# Patient Record
Sex: FEMALE | Race: BLACK OR AFRICAN AMERICAN | Employment: FULL TIME | URBAN - METROPOLITAN AREA
[De-identification: names, ages, dates, MRNs, and addresses within clinical notes are randomized per-mention and may not be internally consistent; named-entity substitution may affect disease eponyms.]

---

## 2018-08-21 ENCOUNTER — OP HISTORICAL/CONVERTED ENCOUNTER (OUTPATIENT)
Dept: OTHER | Age: 73
End: 2018-08-21

## 2018-08-22 ENCOUNTER — IP HISTORICAL/CONVERTED ENCOUNTER (OUTPATIENT)
Dept: OTHER | Age: 73
End: 2018-08-22

## 2018-09-06 ENCOUNTER — OP HISTORICAL/CONVERTED ENCOUNTER (OUTPATIENT)
Dept: OTHER | Age: 73
End: 2018-09-06

## 2018-09-20 ENCOUNTER — OP HISTORICAL/CONVERTED ENCOUNTER (OUTPATIENT)
Dept: OTHER | Age: 73
End: 2018-09-20

## 2018-10-04 ENCOUNTER — OP HISTORICAL/CONVERTED ENCOUNTER (OUTPATIENT)
Dept: OTHER | Age: 73
End: 2018-10-04

## 2018-10-25 ENCOUNTER — OP HISTORICAL/CONVERTED ENCOUNTER (OUTPATIENT)
Dept: OTHER | Age: 73
End: 2018-10-25

## 2020-06-27 ENCOUNTER — OP HISTORICAL/CONVERTED ENCOUNTER (OUTPATIENT)
Dept: OTHER | Age: 75
End: 2020-06-27

## 2021-08-18 ENCOUNTER — APPOINTMENT (OUTPATIENT)
Dept: GENERAL RADIOLOGY | Age: 76
DRG: 640 | End: 2021-08-18
Attending: EMERGENCY MEDICINE
Payer: MEDICARE

## 2021-08-18 ENCOUNTER — HOSPITAL ENCOUNTER (INPATIENT)
Age: 76
LOS: 3 days | Discharge: HOME | DRG: 640 | End: 2021-08-22
Attending: STUDENT IN AN ORGANIZED HEALTH CARE EDUCATION/TRAINING PROGRAM | Admitting: INTERNAL MEDICINE
Payer: MEDICARE

## 2021-08-18 DIAGNOSIS — I50.9 ACUTE ON CHRONIC HEART FAILURE, UNSPECIFIED HEART FAILURE TYPE (HCC): ICD-10-CM

## 2021-08-18 DIAGNOSIS — I47.29 NONSUSTAINED VENTRICULAR TACHYCARDIA: ICD-10-CM

## 2021-08-18 DIAGNOSIS — E87.6 HYPOKALEMIA: Primary | ICD-10-CM

## 2021-08-18 LAB
ALBUMIN SERPL-MCNC: 3.4 G/DL (ref 3.5–5)
ALBUMIN/GLOB SERPL: 1 {RATIO} (ref 1.1–2.2)
ALP SERPL-CCNC: 47 U/L (ref 45–117)
ALT SERPL-CCNC: 23 U/L (ref 12–78)
ANION GAP SERPL CALC-SCNC: 5 MMOL/L (ref 5–15)
AST SERPL W P-5'-P-CCNC: 18 U/L (ref 15–37)
BASOPHILS # BLD: 0 K/UL (ref 0–0.1)
BASOPHILS NFR BLD: 1 % (ref 0–1)
BILIRUB SERPL-MCNC: 1.2 MG/DL (ref 0.2–1)
BUN SERPL-MCNC: 18 MG/DL (ref 6–20)
BUN/CREAT SERPL: 18 (ref 12–20)
CA-I BLD-MCNC: 8.6 MG/DL (ref 8.5–10.1)
CHLORIDE SERPL-SCNC: 105 MMOL/L (ref 97–108)
CK SERPL-CCNC: 70 NG/ML (ref 26–192)
CO2 SERPL-SCNC: 30 MMOL/L (ref 21–32)
CREAT SERPL-MCNC: 0.98 MG/DL (ref 0.55–1.02)
DIFFERENTIAL METHOD BLD: NORMAL
EOSINOPHIL # BLD: 0.1 K/UL (ref 0–0.4)
EOSINOPHIL NFR BLD: 2 % (ref 0–7)
ERYTHROCYTE [DISTWIDTH] IN BLOOD BY AUTOMATED COUNT: 14.1 % (ref 11.5–14.5)
GLOBULIN SER CALC-MCNC: 3.4 G/DL (ref 2–4)
GLUCOSE SERPL-MCNC: 120 MG/DL (ref 65–100)
HCT VFR BLD AUTO: 37.2 % (ref 35–47)
HGB BLD-MCNC: 12.2 G/DL (ref 11.5–16)
IMM GRANULOCYTES # BLD AUTO: 0 K/UL (ref 0–0.04)
IMM GRANULOCYTES NFR BLD AUTO: 0 % (ref 0–0.5)
LYMPHOCYTES # BLD: 2.4 K/UL (ref 0.8–3.5)
LYMPHOCYTES NFR BLD: 47 % (ref 12–49)
MAGNESIUM SERPL-MCNC: 2.1 MG/DL (ref 1.6–2.4)
MCH RBC QN AUTO: 30.8 PG (ref 26–34)
MCHC RBC AUTO-ENTMCNC: 32.8 G/DL (ref 30–36.5)
MCV RBC AUTO: 93.9 FL (ref 80–99)
MONOCYTES # BLD: 0.6 K/UL (ref 0–1)
MONOCYTES NFR BLD: 12 % (ref 5–13)
NEUTS SEG # BLD: 2 K/UL (ref 1.8–8)
NEUTS SEG NFR BLD: 38 % (ref 32–75)
NRBC # BLD: 0 K/UL (ref 0–0.01)
NRBC BLD-RTO: 0 PER 100 WBC
PLATELET # BLD AUTO: 205 K/UL (ref 150–400)
PMV BLD AUTO: 10 FL (ref 8.9–12.9)
POTASSIUM SERPL-SCNC: 2.6 MMOL/L (ref 3.5–5.1)
PROT SERPL-MCNC: 6.8 G/DL (ref 6.4–8.2)
RBC # BLD AUTO: 3.96 M/UL (ref 3.8–5.2)
SODIUM SERPL-SCNC: 140 MMOL/L (ref 136–145)
TROPONIN I SERPL-MCNC: <0.05 NG/ML
WBC # BLD AUTO: 5.1 K/UL (ref 3.6–11)

## 2021-08-18 PROCEDURE — 85025 COMPLETE CBC W/AUTO DIFF WBC: CPT

## 2021-08-18 PROCEDURE — 83880 ASSAY OF NATRIURETIC PEPTIDE: CPT

## 2021-08-18 PROCEDURE — 96366 THER/PROPH/DIAG IV INF ADDON: CPT

## 2021-08-18 PROCEDURE — 71045 X-RAY EXAM CHEST 1 VIEW: CPT

## 2021-08-18 PROCEDURE — 99285 EMERGENCY DEPT VISIT HI MDM: CPT

## 2021-08-18 PROCEDURE — 96365 THER/PROPH/DIAG IV INF INIT: CPT

## 2021-08-18 PROCEDURE — 83735 ASSAY OF MAGNESIUM: CPT

## 2021-08-18 PROCEDURE — 82550 ASSAY OF CK (CPK): CPT

## 2021-08-18 PROCEDURE — 74011250637 HC RX REV CODE- 250/637: Performed by: STUDENT IN AN ORGANIZED HEALTH CARE EDUCATION/TRAINING PROGRAM

## 2021-08-18 PROCEDURE — 36415 COLL VENOUS BLD VENIPUNCTURE: CPT

## 2021-08-18 PROCEDURE — 80053 COMPREHEN METABOLIC PANEL: CPT

## 2021-08-18 PROCEDURE — 93005 ELECTROCARDIOGRAM TRACING: CPT

## 2021-08-18 PROCEDURE — 74011250636 HC RX REV CODE- 250/636

## 2021-08-18 PROCEDURE — 84484 ASSAY OF TROPONIN QUANT: CPT

## 2021-08-18 RX ORDER — POTASSIUM CHLORIDE 20 MEQ/1
60 TABLET, EXTENDED RELEASE ORAL
Status: COMPLETED | OUTPATIENT
Start: 2021-08-18 | End: 2021-08-18

## 2021-08-18 RX ORDER — POTASSIUM CHLORIDE 7.45 MG/ML
10 INJECTION INTRAVENOUS
Status: COMPLETED | OUTPATIENT
Start: 2021-08-18 | End: 2021-08-19

## 2021-08-18 RX ORDER — POTASSIUM CHLORIDE 7.45 MG/ML
INJECTION INTRAVENOUS
Status: COMPLETED
Start: 2021-08-18 | End: 2021-08-18

## 2021-08-18 RX ADMIN — POTASSIUM CHLORIDE 60 MEQ: 1500 TABLET, EXTENDED RELEASE ORAL at 23:42

## 2021-08-18 RX ADMIN — POTASSIUM CHLORIDE 10 MEQ: 7.46 INJECTION, SOLUTION INTRAVENOUS at 22:47

## 2021-08-19 PROBLEM — E87.6 HYPOKALEMIA: Status: ACTIVE | Noted: 2021-08-19

## 2021-08-19 PROBLEM — I47.20 VT (VENTRICULAR TACHYCARDIA): Status: ACTIVE | Noted: 2021-08-19

## 2021-08-19 PROBLEM — R55 SYNCOPE: Status: ACTIVE | Noted: 2021-08-19

## 2021-08-19 PROBLEM — I50.9 ACUTE CHF (CONGESTIVE HEART FAILURE) (HCC): Status: ACTIVE | Noted: 2021-08-19

## 2021-08-19 LAB
ANION GAP SERPL CALC-SCNC: 5 MMOL/L (ref 5–15)
BNP SERPL-MCNC: 9974 PG/ML
BUN SERPL-MCNC: 15 MG/DL (ref 6–20)
BUN/CREAT SERPL: 17 (ref 12–20)
CA-I BLD-MCNC: 7.9 MG/DL (ref 8.5–10.1)
CHLORIDE SERPL-SCNC: 110 MMOL/L (ref 97–108)
CO2 SERPL-SCNC: 29 MMOL/L (ref 21–32)
CREAT SERPL-MCNC: 0.86 MG/DL (ref 0.55–1.02)
ERYTHROCYTE [DISTWIDTH] IN BLOOD BY AUTOMATED COUNT: 14.2 % (ref 11.5–14.5)
GLUCOSE SERPL-MCNC: 87 MG/DL (ref 65–100)
HCT VFR BLD AUTO: 42.3 % (ref 35–47)
HGB BLD-MCNC: 13.4 G/DL (ref 11.5–16)
MCH RBC QN AUTO: 30.1 PG (ref 26–34)
MCHC RBC AUTO-ENTMCNC: 31.7 G/DL (ref 30–36.5)
MCV RBC AUTO: 95.1 FL (ref 80–99)
MRSA DNA SPEC QL NAA+PROBE: NOT DETECTED
NRBC # BLD: 0 K/UL (ref 0–0.01)
NRBC BLD-RTO: 0 PER 100 WBC
PLATELET # BLD AUTO: 179 K/UL (ref 150–400)
PMV BLD AUTO: 9.6 FL (ref 8.9–12.9)
POTASSIUM SERPL-SCNC: 4 MMOL/L (ref 3.5–5.1)
RBC # BLD AUTO: 4.45 M/UL (ref 3.8–5.2)
SODIUM SERPL-SCNC: 144 MMOL/L (ref 136–145)
TROPONIN I SERPL-MCNC: <0.05 NG/ML
WBC # BLD AUTO: 4.1 K/UL (ref 3.6–11)

## 2021-08-19 PROCEDURE — 74011250637 HC RX REV CODE- 250/637: Performed by: HOSPITALIST

## 2021-08-19 PROCEDURE — 36415 COLL VENOUS BLD VENIPUNCTURE: CPT

## 2021-08-19 PROCEDURE — 74011250636 HC RX REV CODE- 250/636: Performed by: INTERNAL MEDICINE

## 2021-08-19 PROCEDURE — 74011250637 HC RX REV CODE- 250/637: Performed by: INTERNAL MEDICINE

## 2021-08-19 PROCEDURE — 74011250636 HC RX REV CODE- 250/636: Performed by: HOSPITALIST

## 2021-08-19 PROCEDURE — 74011000258 HC RX REV CODE- 258: Performed by: INTERNAL MEDICINE

## 2021-08-19 PROCEDURE — 80048 BASIC METABOLIC PNL TOTAL CA: CPT

## 2021-08-19 PROCEDURE — 65270000029 HC RM PRIVATE

## 2021-08-19 PROCEDURE — 96366 THER/PROPH/DIAG IV INF ADDON: CPT

## 2021-08-19 PROCEDURE — 74011250636 HC RX REV CODE- 250/636: Performed by: STUDENT IN AN ORGANIZED HEALTH CARE EDUCATION/TRAINING PROGRAM

## 2021-08-19 PROCEDURE — 85027 COMPLETE CBC AUTOMATED: CPT

## 2021-08-19 PROCEDURE — 96368 THER/DIAG CONCURRENT INF: CPT

## 2021-08-19 PROCEDURE — 84484 ASSAY OF TROPONIN QUANT: CPT

## 2021-08-19 PROCEDURE — 87641 MR-STAPH DNA AMP PROBE: CPT

## 2021-08-19 RX ORDER — ACETAMINOPHEN 650 MG/1
650 SUPPOSITORY RECTAL
Status: DISCONTINUED | OUTPATIENT
Start: 2021-08-19 | End: 2021-08-22 | Stop reason: HOSPADM

## 2021-08-19 RX ORDER — HYDROXYZINE PAMOATE 25 MG/1
25 CAPSULE ORAL
Status: DISCONTINUED | OUTPATIENT
Start: 2021-08-19 | End: 2021-08-22

## 2021-08-19 RX ORDER — SPIRONOLACTONE 25 MG/1
25 TABLET ORAL DAILY
Status: DISCONTINUED | OUTPATIENT
Start: 2021-08-20 | End: 2021-08-22 | Stop reason: HOSPADM

## 2021-08-19 RX ORDER — CARVEDILOL 12.5 MG/1
12.5 TABLET ORAL 2 TIMES DAILY WITH MEALS
Status: DISCONTINUED | OUTPATIENT
Start: 2021-08-19 | End: 2021-08-22 | Stop reason: HOSPADM

## 2021-08-19 RX ORDER — ENOXAPARIN SODIUM 100 MG/ML
40 INJECTION SUBCUTANEOUS DAILY
Status: DISCONTINUED | OUTPATIENT
Start: 2021-08-19 | End: 2021-08-22 | Stop reason: HOSPADM

## 2021-08-19 RX ORDER — CARVEDILOL 12.5 MG/1
12.5 TABLET ORAL 2 TIMES DAILY WITH MEALS
COMMUNITY

## 2021-08-19 RX ORDER — LEVOTHYROXINE SODIUM 100 UG/1
100 TABLET ORAL
COMMUNITY

## 2021-08-19 RX ORDER — ONDANSETRON 2 MG/ML
4 INJECTION INTRAMUSCULAR; INTRAVENOUS
Status: DISCONTINUED | OUTPATIENT
Start: 2021-08-19 | End: 2021-08-22 | Stop reason: HOSPADM

## 2021-08-19 RX ORDER — SODIUM CHLORIDE 0.9 % (FLUSH) 0.9 %
5-40 SYRINGE (ML) INJECTION AS NEEDED
Status: DISCONTINUED | OUTPATIENT
Start: 2021-08-19 | End: 2021-08-22 | Stop reason: HOSPADM

## 2021-08-19 RX ORDER — ACETAMINOPHEN 650 MG/1
660 SUPPOSITORY RECTAL
Status: DISCONTINUED | OUTPATIENT
Start: 2021-08-19 | End: 2021-08-19

## 2021-08-19 RX ORDER — ASPIRIN 81 MG/1
81 TABLET ORAL DAILY
Status: DISCONTINUED | OUTPATIENT
Start: 2021-08-20 | End: 2021-08-22 | Stop reason: HOSPADM

## 2021-08-19 RX ORDER — POLYETHYLENE GLYCOL 3350 17 G/17G
17 POWDER, FOR SOLUTION ORAL DAILY PRN
Status: DISCONTINUED | OUTPATIENT
Start: 2021-08-19 | End: 2021-08-22 | Stop reason: HOSPADM

## 2021-08-19 RX ORDER — AMIODARONE HYDROCHLORIDE 200 MG/1
200 TABLET ORAL DAILY
Status: ON HOLD | COMMUNITY
End: 2021-08-22 | Stop reason: SDUPTHER

## 2021-08-19 RX ORDER — ASPIRIN 81 MG/1
81 TABLET ORAL DAILY
COMMUNITY

## 2021-08-19 RX ORDER — ACETAMINOPHEN 325 MG/1
650 TABLET ORAL
Status: DISCONTINUED | OUTPATIENT
Start: 2021-08-19 | End: 2021-08-19

## 2021-08-19 RX ORDER — FUROSEMIDE 40 MG/1
40 TABLET ORAL DAILY
COMMUNITY

## 2021-08-19 RX ORDER — MAGNESIUM SULFATE HEPTAHYDRATE 40 MG/ML
2 INJECTION, SOLUTION INTRAVENOUS
Status: COMPLETED | OUTPATIENT
Start: 2021-08-19 | End: 2021-08-19

## 2021-08-19 RX ORDER — FAMOTIDINE 10 MG/1
10 TABLET ORAL 2 TIMES DAILY
Status: DISCONTINUED | OUTPATIENT
Start: 2021-08-19 | End: 2021-08-22 | Stop reason: HOSPADM

## 2021-08-19 RX ORDER — FAMOTIDINE 20 MG/1
20 TABLET, FILM COATED ORAL 2 TIMES DAILY
Status: DISCONTINUED | OUTPATIENT
Start: 2021-08-19 | End: 2021-08-19

## 2021-08-19 RX ORDER — SODIUM CHLORIDE 0.9 % (FLUSH) 0.9 %
5-40 SYRINGE (ML) INJECTION EVERY 8 HOURS
Status: DISCONTINUED | OUTPATIENT
Start: 2021-08-19 | End: 2021-08-22 | Stop reason: HOSPADM

## 2021-08-19 RX ORDER — FUROSEMIDE 10 MG/ML
40 INJECTION INTRAMUSCULAR; INTRAVENOUS DAILY
Status: DISCONTINUED | OUTPATIENT
Start: 2021-08-20 | End: 2021-08-22 | Stop reason: HOSPADM

## 2021-08-19 RX ORDER — HYDRALAZINE HYDROCHLORIDE AND ISOSORBIDE DINITRATE 37.5; 2 MG/1; MG/1
1 TABLET, FILM COATED ORAL 3 TIMES DAILY
COMMUNITY
End: 2021-08-22

## 2021-08-19 RX ORDER — FUROSEMIDE 10 MG/ML
40 INJECTION INTRAMUSCULAR; INTRAVENOUS 2 TIMES DAILY
Status: DISCONTINUED | OUTPATIENT
Start: 2021-08-19 | End: 2021-08-19

## 2021-08-19 RX ORDER — ACETAMINOPHEN 325 MG/1
650 TABLET ORAL
Status: DISCONTINUED | OUTPATIENT
Start: 2021-08-19 | End: 2021-08-22 | Stop reason: HOSPADM

## 2021-08-19 RX ORDER — LEVOTHYROXINE SODIUM 100 UG/1
100 TABLET ORAL
Status: DISCONTINUED | OUTPATIENT
Start: 2021-08-20 | End: 2021-08-22 | Stop reason: HOSPADM

## 2021-08-19 RX ADMIN — HYDROXYZINE PAMOATE 25 MG: 25 CAPSULE ORAL at 17:34

## 2021-08-19 RX ADMIN — AMIODARONE HYDROCHLORIDE 0.5 MG/MIN: 50 INJECTION, SOLUTION INTRAVENOUS at 18:18

## 2021-08-19 RX ADMIN — ACETAMINOPHEN 650 MG: 325 TABLET ORAL at 18:24

## 2021-08-19 RX ADMIN — POTASSIUM CHLORIDE 10 MEQ: 7.46 INJECTION, SOLUTION INTRAVENOUS at 00:03

## 2021-08-19 RX ADMIN — Medication 10 ML: at 22:21

## 2021-08-19 RX ADMIN — CARVEDILOL 12.5 MG: 12.5 TABLET, FILM COATED ORAL at 16:51

## 2021-08-19 RX ADMIN — ONDANSETRON 4 MG: 2 INJECTION INTRAMUSCULAR; INTRAVENOUS at 22:21

## 2021-08-19 RX ADMIN — FUROSEMIDE 40 MG: 10 INJECTION, SOLUTION INTRAMUSCULAR; INTRAVENOUS at 01:37

## 2021-08-19 RX ADMIN — AMIODARONE HYDROCHLORIDE 1 MG/MIN: 50 INJECTION, SOLUTION INTRAVENOUS at 10:28

## 2021-08-19 RX ADMIN — Medication 10 ML: at 14:35

## 2021-08-19 RX ADMIN — MAGNESIUM SULFATE HEPTAHYDRATE 2 G: 40 INJECTION, SOLUTION INTRAVENOUS at 00:28

## 2021-08-19 RX ADMIN — ENOXAPARIN SODIUM 40 MG: 40 INJECTION SUBCUTANEOUS at 08:46

## 2021-08-19 RX ADMIN — Medication 10 ML: at 06:41

## 2021-08-19 RX ADMIN — FAMOTIDINE 10 MG: 10 TABLET ORAL at 11:19

## 2021-08-19 RX ADMIN — POTASSIUM CHLORIDE 10 MEQ: 7.46 INJECTION, SOLUTION INTRAVENOUS at 01:36

## 2021-08-19 RX ADMIN — FAMOTIDINE 10 MG: 10 TABLET ORAL at 22:25

## 2021-08-19 NOTE — PROGRESS NOTES
Hospitalist Progress Note               Daily Progress Note: 2021      Subjective: The patient is seen for follow  up. 75-year-old female with a history of hypertension, hypothyroidism, congestive heart failure status post AICD placement was admitted to the hospital with a syncopal episode. Patient was found to have nonsustained V. tach and multiple AICD firing on interrogation in the ER. Patient was started on amiodarone drip. Patient is comfortable now. She was also noticed to have hypokalemia which is improved after supplementation. Medications reviewed  Current Facility-Administered Medications   Medication Dose Route Frequency    sodium chloride (NS) flush 5-40 mL  5-40 mL IntraVENous Q8H    sodium chloride (NS) flush 5-40 mL  5-40 mL IntraVENous PRN    polyethylene glycol (MIRALAX) packet 17 g  17 g Oral DAILY PRN    enoxaparin (LOVENOX) injection 40 mg  40 mg SubCUTAneous DAILY    acetaminophen (TYLENOL) suppository 650 mg  650 mg Rectal Q6H PRN    Or    acetaminophen (TYLENOL) tablet 650 mg  650 mg Oral Q6H PRN    [START ON 2021] furosemide (LASIX) injection 40 mg  40 mg IntraVENous DAILY    amiodarone (CORDARONE) 375 mg in dextrose 5% 250 mL infusion  0.5-1 mg/min IntraVENous TITRATE       Review of Systems:   A comprehensive review of systems was negative. Objective:   Physical Exam:     Visit Vitals  /79 (BP 1 Location: Left upper arm, BP Patient Position: At rest)   Pulse 73   Temp 97.9 °F (36.6 °C)   Resp 24   Ht 5' 1\" (1.549 m)   Wt 59.1 kg (130 lb 4.7 oz)   SpO2 97%   BMI 24.62 kg/m²      O2 Device: None (Room air)    Temp (24hrs), Av °F (36.7 °C), Min:97.9 °F (36.6 °C), Max:98.1 °F (36.7 °C)    No intake/output data recorded.  1901 -  0700  In: 350 [I.V.:350]  Out: 1250 [Urine:1250]    PHYSICAL EXAM:  Skin: Extremities and face reveal no rashes. HEENT: Sclerae anicteric. Extra-occular muscles are intact. No oral ulcers. No ENT discharge.  The neck is supple. Cardiovascular: Regular rate and rhythm. No murmurs, gallops, or rubs. PMI nondisplaced. Carotids without bruits. Respiratory: Comfortable breathing with no accessory muscle use. Clear breath sounds with no wheezes, rales, or rhonchi. GI: Abdomen nondistended, soft, and nontender. Normal active bowel sounds. No enlargement of the liver or spleen. No masses palpable. Rectal: Deferred   Musculoskeletal: No pitting edema of the lower legs. Extremities have good range of motion. No costovertebral tenderness. Neurological: Gross memory appears intact. Patient is alert and oriented. Power 5/5, Cranial nerves II-XII  intact  Psychiatric: Mood appears appropriate with judgement intact. Data Review:       Recent Days:  Recent Labs     08/19/21  0530 08/18/21  2100   WBC 4.1 5.1   HGB 13.4 12.2   HCT 42.3 37.2    205     Recent Labs     08/19/21  0530 08/18/21  2100    140   K 4.0 2.6*   * 105   CO2 29 30   GLU 87 120*   BUN 15 18   CREA 0.86 0.98   CA 7.9* 8.6   MG  --  2.1   ALB  --  3.4*   TBILI  --  1.2*   ALT  --  23     No results for input(s): PH, PCO2, PO2, HCO3, FIO2 in the last 72 hours. XR CHEST PORT   Final Result   Enlarged cardiac silhouette. Central pulmonary vascular congestion. Assessment/     Problem List:  Hospital Problems  Never Reviewed        Codes Class Noted POA    Hypokalemia ICD-10-CM: E87.6  ICD-9-CM: 276.8  8/19/2021 Unknown        Syncope ICD-10-CM: R55  ICD-9-CM: 780.2  8/19/2021 Unknown        VT (ventricular tachycardia) (HCC) ICD-10-CM: I47.2  ICD-9-CM: 427.1  8/19/2021 Unknown        Acute CHF (congestive heart failure) ([facility] [address] ICD-10-CM: I50.9  ICD-9-CM: 428.0  8/19/2021 Unknown                     Plan:  Frequent nonsustained VT  -Nonsustained VT and VF on AICD interrogation  -14 J cardioversion delivered on 8/14/2021.   No ATP or shocks   -Hypokalemic- improved, normal magnesium  -Receiving IV magnesium and KCl  -Please call cardiology consult during the day  -Obtain records from primary cardiologist in [place]     Syncope  -Likely due to above     Acute HFrEF  -Status post AICD  -IV Lasix  -Intake, output monitoring. Daily weight     Hypokalemia  -Potassium is 2.6  -IV and p.o. KCl. Follow-up BMP     Hypothyroidism  -On Synthroid- restarted     Needed for medication reconciliation  Med rec done    Safety:  Code Status: Full Code   DVT prophylaxis:lovenox  Stress Ulcer prophylaxis:pepcid  Family Contact Info:    Disposition:home  Consults: Cardiology    Care Plan discussed with: Patient/Family and Nurse  Patient can be transferred to tele floor.     Mojgan Tyson MD

## 2021-08-19 NOTE — ED PROVIDER NOTES
EMERGENCY DEPARTMENT HISTORY AND PHYSICAL EXAM      Date: 8/18/2021  Patient Name: Bal Streeter      History of Presenting Illness     Chief Complaint   Patient presents with    Syncope    Dizziness       History Provided By: Patient    HPI: Bal Streeter, 64 y.o. female with PMH of CHF and HTN with an AICD placed earlier this year presented numerous apartment with dizziness. Patient reports that she called dizzy at home in which she felt weak and fatigued and that she was in a fall. She did not fall or hit her head. She denied any preceding chest pain, shortness of breath, or palpitation. While she is here in the ED, patient did have another episode of dizziness and feeling weak while she is laying in bed. There is no accompanying chest pain, shortness of breath, diaphoresis. Patient felt that she is sick to her stomach but did not vomit. Of note, patient reports that she is trying to lose weight and has been having decreased p.o. intake. However, she reports that she is being adequately hydrated and she is drinking enough fluids and juice. There are no other complaints, changes, or physical findings at this time. PCP: None        Past History     Past Medical History:  Past Medical History:   Diagnosis Date    CHF [facility]congestive heart failure) ([facility] [address]     Hypertension        Past Surgical History:  Past Surgical History:   Procedure Laterality Date    HX BIV-IMPLANTABLE CARDIOVERTER DEFIBRILLATOR         Family History:  History reviewed. No pertinent family history. Social History:  Social History     Tobacco Use    Smoking status: Former Smoker    Smokeless tobacco: Never Used   Vaping Use    Vaping Use: Never used   Substance Use Topics    Alcohol use: Yes     Comment: sip of wine at times    [place] Drug use: Not Currently     Types: Marijuana       Allergies:  No Known Allergies      Review of Systems     Review of Systems   Constitutional: Negative for activity change, chills and fever. HENT: Negative for congestion and facial swelling. Eyes: Negative for discharge and visual disturbance. Respiratory: Negative for chest tightness and shortness of breath. Cardiovascular: Negative for chest pain and leg swelling. Gastrointestinal: Negative for abdominal pain, diarrhea and vomiting. Genitourinary: Negative for dysuria and flank pain. Musculoskeletal: Negative for back pain and gait problem. Skin: Negative for rash and wound. Neurological: Positive for dizziness and weakness (Generalized). Negative for headaches. Physical Exam     Physical Exam  Constitutional:       Comments: Frail and chronically ill elderly female   HENT:      Head: Normocephalic and atraumatic. Mouth/Throat:      Mouth: Mucous membranes are moist.      Pharynx: Oropharynx is clear. Eyes:      Extraocular Movements: Extraocular movements intact. Conjunctiva/sclera: Conjunctivae normal.   Cardiovascular:      Rate and Rhythm: Normal rate and regular rhythm. Pulmonary:      Effort: Pulmonary effort is normal.      Breath sounds: Normal breath sounds. Abdominal:      General: Abdomen is flat. Palpations: Abdomen is soft. Tenderness: There is no abdominal tenderness. Musculoskeletal:         General: No tenderness or deformity. Cervical back: Normal range of motion and neck supple. Skin:     General: Skin is warm and dry. Neurological:      General: No focal deficit present. Mental Status: She is oriented to person, place, and time.          Lab and Diagnostic Study Results     Labs -     Recent Results (from the past 12 hour(s))   CBC WITH AUTOMATED DIFF    Collection Time: 08/18/21  9:00 PM   Result Value Ref Range    WBC 5.1 3.6 - 11.0 K/uL    RBC 3.96 3.80 - 5.20 M/uL    HGB 12.2 11.5 - 16.0 g/dL    HCT 37.2 35.0 - 47.0 %    MCV 93.9 80.0 - 99.0 FL    MCH 30.8 26.0 - 34.0 PG    MCHC 32.8 30.0 - 36.5 g/dL    RDW 14.1 11.5 - 14.5 %    PLATELET 249 285 - 853 K/uL    MPV 10.0 8.9 - 12.9 FL    NRBC 0.0 0.0  WBC    ABSOLUTE NRBC 0.00 0.00 - 0.01 K/uL    NEUTROPHILS 38 32 - 75 %    LYMPHOCYTES 47 12 - 49 %    MONOCYTES 12 5 - 13 %    EOSINOPHILS 2 0 - 7 %    BASOPHILS 1 0 - 1 %    IMMATURE GRANULOCYTES 0 0 - 0.5 %    ABS. NEUTROPHILS 2.0 1.8 - 8.0 K/UL    ABS. LYMPHOCYTES 2.4 0.8 - 3.5 K/UL    ABS. MONOCYTES 0.6 0.0 - 1.0 K/UL    ABS. EOSINOPHILS 0.1 0.0 - 0.4 K/UL    ABS. BASOPHILS 0.0 0.0 - 0.1 K/UL    ABS. IMM. GRANS. 0.0 0.00 - 0.04 K/UL    DF AUTOMATED     METABOLIC PANEL, COMPREHENSIVE    Collection Time: 08/18/21  9:00 PM   Result Value Ref Range    Sodium 140 136 - 145 mmol/L    Potassium 2.6 (LL) 3.5 - 5.1 mmol/L    Chloride 105 97 - 108 mmol/L    CO2 30 21 - 32 mmol/L    Anion gap 5 5 - 15 mmol/L    Glucose 120 (H) 65 - 100 mg/dL    BUN 18 6 - 20 mg/dL    Creatinine 0.98 0.55 - 1.02 mg/dL    BUN/Creatinine ratio 18 12 - 20      GFR est AA >60 >60 ml/min/1.73m2    GFR est non-AA 55 (L) >60 ml/min/1.73m2    Calcium 8.6 8.5 - 10.1 mg/dL    Bilirubin, total 1.2 (H) 0.2 - 1.0 mg/dL    AST (SGOT) 18 15 - 37 U/L    ALT (SGPT) 23 12 - 78 U/L    Alk.  phosphatase 47 45 - 117 U/L    Protein, total 6.8 6.4 - 8.2 g/dL    Albumin 3.4 (L) 3.5 - 5.0 g/dL    Globulin 3.4 2.0 - 4.0 g/dL    A-G Ratio 1.0 (L) 1.1 - 2.2     CK W/ REFLX CKMB    Collection Time: 08/18/21  9:00 PM   Result Value Ref Range    CK 70.0 26 - 192 ng/mL   TROPONIN I    Collection Time: 08/18/21  9:00 PM   Result Value Ref Range    Troponin-I, Qt. <0.05 <0.05 ng/mL   MAGNESIUM    Collection Time: 08/18/21  9:00 PM   Result Value Ref Range    Magnesium 2.1 1.6 - 2.4 mg/dL   BNP    Collection Time: 08/18/21  9:00 PM   Result Value Ref Range    NT pro-BNP 9,974 (H) <450 pg/mL   TROPONIN I    Collection Time: 08/19/21 12:07 AM   Result Value Ref Range    Troponin-I, Qt. <0.05 <0.05 ng/mL       Radiologic Studies -   [unfilled]  CT Results  (Last 48 hours)    None        CXR Results  (Last 48 hours) 08/18/21 2108  XR CHEST PORT Final result    Impression:  Enlarged cardiac silhouette. Central pulmonary vascular congestion. Narrative:  Examination: XR CHEST PORT        History: chest pain       Comparison: None available. FINDINGS:       Single frontal portable view of the chest. Symmetric low lung volumes. Enlarged   cardiac silhouette. Multilead AICD in place with generator pack overlying and   obscuring portions of the left chest. Central pulmonary vascular congestion. No   definite focal airspace consolidation, pneumothorax, or significant pleural   effusion. Atherosclerosis of the thoracic aorta. No acute or aggressive osseous   abnormalities are evident. Medical Decision Making and ED Course   - I am the first and primary provider for this patient AND AM THE PRIMARY PROVIDER OF RECORD. - I reviewed the vital signs, available nursing notes, past medical history, past surgical history, family history and social history. - Initial assessment performed. The patients presenting problems have been discussed, and the staff are in agreement with the care plan formulated and outlined with them. I have encouraged them to ask questions as they arise throughout their visit. Vital Signs-Reviewed the patient's vital signs. Patient Vitals for the past 12 hrs:   Temp Pulse Resp BP SpO2   08/19/21 0315 98 °F (36.7 °C) 79 20 116/84 97 %   08/19/21 0145  86 28 119/80 96 %   08/19/21 0030  80 16 127/84 97 %   08/18/21 2054 98.1 °F (36.7 °C) 88 15 (!) 121/99 98 %         Records Reviewed: Nursing Notes    Provider Notes (Medical Decision Making):   22-year-old female with PMH as above presents emergency department with dizziness neurolysed weakness. Concern for dehydration versus AICD fire versus electrolyte derangement versus ACS. EKG did not show signs of ischemic changes. Will get blood work, chest x-ray, and serial EKGs.   Patient is unsafe to be admitted to the hospital.    ED Course:       ED Course as of Aug 19 0350   Wed Aug 18, 2021   2100 EKG: Ventricular paced rhythm with PACs, rate 86, VA within normal, QRS prolonged, QTC prolonged, normal axis, T wave inverted in lead I, 2, 3, aVF there is no ST changes meeting Sgar[place] Criteria. [AA]   2238 Runs of V Tach. K 2.6. Will replete potassium orally and intravenously. [AA]   Thu Aug 19, 2021   0000 Patient does have an AICD. Device was interrogated and revealed several nonsustained V. tach's over the last few days. Is worse yesterday and today. [AA]   0100 Patient did have 3 rounds of intravenous potassium replacement. She also did have magnesium. She had multiple runs of nonsustained V. tach which is probably from electrolyte derangement versus CHF. I did admit the patient to the hospital for further management for electrolyte derangement and nonsustained V. tach. Given the concern that she may become hemodynamically unstable and her severe electrolyte derangement and nonsustained V. tach. Patient will be admitted to the ICU. [AA]      ED Course User Index  [AA] Bruno Marino MD         Procedures and Critical Care         CRITICAL CARE NOTE :  9:34 PM  Amount of Critical Care Time: 35(minutes)    IMPENDING DETERIORATION -Cardiovascular and Metabolic  ASSOCIATED RISK FACTORS - Dysrhythmia and Metabolic changes  MANAGEMENT- Bedside Assessment, Supervision of Care and Transfer  INTERPRETATION -  Xrays, ECG and Blood Pressure  INTERVENTIONS - hemodynamic mngmt and electrolyte management  CASE REVIEW - Hospitalist/Intensivist  TREATMENT RESPONSE -Stable  PERFORMED BY - Self    NOTES   :  I have spent critical care time involved in lab review, consultations with specialist, family decision- making, bedside attention and documentation. This time excludes time spent in any separate billed procedures. During this entire length of time I was immediately available to the patient .     Martine Jenkins MD        Disposition Disposition: Condition ongoing  Admitted to ICU Medical ICU the case was discussed with the admitting physician Justin Mosqueda    Admitted    Diagnosis     Clinical Impression:   1. Hypokalemia    2. Nonsustained ventricular tachycardia ([address]    3. Acute on chronic heart failure, unspecified heart failure type [facility])        Attestations: Abran Valle MD    Please note that this dictation was completed with [employer], the computer voice recognition software. Quite often unanticipated grammatical, syntax, homophones, and other interpretive errors are inadvertently transcribed by the computer software. Please disregard these errors. Please excuse any errors that have escaped final proofreading. Thank you.

## 2021-08-19 NOTE — ED NOTES
Patients pacemaker interrogated at this time, [employer] called and spoke with company at this time and I am awaiting fax from them.

## 2021-08-19 NOTE — ED NOTES
Patient noted to have run of Alison Wade, Dr Alex Costa notified and brought to patients bedside EKG strip printed and placed on patients chart, Potassium ordered and hung

## 2021-08-19 NOTE — CONSULTS
Consult    Patient: Shaheed Villavicencio MRN: [id number]  SSN: [id number]    YOB: 1945  Age: 64 y.o. Sex: female      Subjective:      Shaheed Villavicencio is a 64 y.o. female who is being seen for syncope. Patient with history of hypertension, CHF, biventricular AICD was been under a lot of stress recently. Her   within the last year as well as her daughter. She lost about $14,000 from her back. When she got up in the morning she was not feeling right. She was feeling dizzy and weak and fatigue have the bathroom and she passed out. She does not think she was out for a long time and when she came in for sepsis she presented to the emergency room. She follows up with Dr. Sugar Becerra. Did not complain of any lower extremity swelling, chest pain, chest tightness, palpitation, AICD shock. Denied recent change in her medication. Her appetite is not great. Past Medical History:   Diagnosis Date    CHF (congestive heart failure) (HCC)     Hypertension      Past Surgical History:   Procedure Laterality Date    HX BIV-IMPLANTABLE CARDIOVERTER DEFIBRILLATOR        History reviewed. No pertinent family history.   Social History     Tobacco Use    Smoking status: Former Smoker    Smokeless tobacco: Never Used   Substance Use Topics    Alcohol use: Yes     Comment: sip of wine at times       Current Facility-Administered Medications   Medication Dose Route Frequency Provider Last Rate Last Admin    furosemide (LASIX) injection 40 mg  40 mg IntraVENous BID Irving Mosqueda MD   40 mg at 21 0137    sodium chloride (NS) flush 5-40 mL  5-40 mL IntraVENous Q8H Irving Mosqueda MD   10 mL at 21 0641    sodium chloride (NS) flush 5-40 mL  5-40 mL IntraVENous PRN Irving Mosqueda MD        polyethylene glycol (MIRALAX) packet 17 g  17 g Oral DAILY PRN Irving Mosqueda MD        enoxaparin (LOVENOX) injection 40 mg  40 mg SubCUTAneous DAILY Irving Mosqueda MD        acetaminophen (TYLENOL) suppository 650 mg  650 mg Rectal Q6H PRN Rhea Crowe MD        Or    acetaminophen (TYLENOL) tablet 650 mg  650 mg Oral Q6H PRN Irving Mosqueda MD            No Known Allergies    Review of Systems:  A comprehensive review of systems was negative except for that written in the History of Present Illness. Objective:     Vitals:    08/19/21 0500 08/19/21 0600 08/19/21 0700 08/19/21 0746   BP: 108/69 108/76 119/79    Pulse: 68 70 74 73   Resp: 29 27 24    Temp:   97.9 °F (36.6 °C)    SpO2: 95% 95% 97%    Weight:       Height:            Physical Exam:  General:  Alert, cooperative, no distress, appears stated age. Eyes:  Conjunctivae/corneas clear. PERRL, EOMs intact. Fundi benign   Ears:  Normal TMs and external ear canals both ears. Nose: Nares normal. Septum midline. Mucosa normal. No drainage or sinus tenderness. Mouth/Throat: Lips, mucosa, and tongue normal. Teeth and gums normal.   Neck: Supple, symmetrical, trachea midline, no adenopathy, thyroid: no enlargment/tenderness/nodules, no carotid bruit and no JVD. Back:   Symmetric, no curvature. ROM normal. No CVA tenderness. Lungs:   Clear to auscultation bilaterally. Heart:  Regular rate and rhythm, S1, S2 normal, no murmur, click, rub or gallop. Abdomen:   Soft, non-tender. Bowel sounds normal. No masses,  No organomegaly. Extremities: Extremities normal, atraumatic, no cyanosis or edema. Pulses: 2+ and symmetric all extremities. Skin: Skin color, texture, turgor normal. No rashes or lesions   Lymph nodes: Cervical, supraclavicular, and axillary nodes normal.   Neurologic: CNII-XII intact. Normal strength, sensation and reflexes throughout.          Assessment:     Hospital Problems  Never Reviewed        Codes Class Noted POA    Hypokalemia ICD-10-CM: E87.6  ICD-9-CM: 276.8  8/19/2021 Unknown        Syncope ICD-10-CM: R55  ICD-9-CM: 780.2  8/19/2021 Unknown        VT (ventricular tachycardia) (HCC) ICD-10-CM: I47.2  ICD-9-CM: 427.1 8/19/2021 Unknown        Acute CHF (congestive heart failure) (HCC) ICD-10-CM: I50.9  ICD-9-CM: 428.0  8/19/2021 Unknown          Patient is a 78-year-old -American female with:  1. Syncope  2. Heart failure with reduced ejection fraction status post BiV ICD, follows up with [facility]  3. Hypothyroidism  4. Hypertension  5.  V. fib treated with shock recently. 6.    Plan:     Her white count is normal, hemoglobin 13.4, platelet 027. BNP was elevated. 2 sets of cardiac markers are negative. Creatinine 0.8, potassium 4.0, sodium 144. Liver function test within normal.  CPK 70. BNP is elevated. Chest x-ray showed cardiomegaly. I reviewed her medication that are mentioned in the chart that included BiDil, Entresto, carvedilol, spironolactone. We will check an echocardiogram  Load patient with amiodarone gtt. Currently on IV Lasix although no signs of volume overload except for the elevated BNP. We will cut back on the Lasix. Further recommendation depending on the results of the above-mentioned test.      Thank you  For involving me in Mrs. Montez care. I will follow.   Signed By: Isaiah Gonzalez MD     August 19, 2021

## 2021-08-19 NOTE — ED NOTES
Note left on writers desk to call kleber Bellkatherine at [phone]   Attempted to do so at this time with no response

## 2021-08-19 NOTE — ED NOTES
TRANSFER - OUT REPORT:    Verbal report given to [facility] on Andreina Arroyo  being transferred to ICU for routine progression of care       Report consisted of patients Situation, Background, Assessment and   Recommendations(SBAR). Information from the following report(s) SBAR, ED Summary and MAR was reviewed with the receiving nurse. Lines:   Peripheral IV 08/18/21 Left;Posterior Hand (Active)       Peripheral IV 08/18/21 Right Antecubital (Active)   Site Assessment Clean, dry, & intact 08/19/21 0037   Phlebitis Assessment 0 08/19/21 0037   Infiltration Assessment 0 08/19/21 0037   Dressing Status Clean, dry, & intact 08/19/21 0037   Dressing Type Transparent 08/19/21 0037   Hub Color/Line Status Patent; Flushed;Blue 08/19/21 0037   Action Taken Blood drawn 08/19/21 0037        Opportunity for questions and clarification was provided.       Patient transported with:   Monitor  Registered Nurse

## 2021-08-19 NOTE — ED NOTES
Patient had another run of Jennifer Worrell MD aware rhythm strip printed and on the patients chart patient moved to Trauma 2 and pads placed on chest at this time

## 2021-08-19 NOTE — ED NOTES
[facility] again at this time because we never received a fax with the interrogation report they state that they will send it again at this time

## 2021-08-19 NOTE — H&P
GENERAL GENERIC H&P/CONSULT    Subjective:  58-year-old female with past medical history of hypertension, hypothyroidism, CHF status post AICD. Patient was in her usual state of health until early morning yesterday where she started feeling dizzy and increase in short of breath. Later in the evening she had a syncopal episode, falling unconscious for unknown period of time. Subsequently patient was brought to the emergency department. In the ER she is tachypneic, multiple nonsustained VT's noted on telemetry. AICD is interrogated, there had been multiple ATR's since last midnight and since around 6 AM yesterday she had multiple nonsustained VT and V. fib without therapy. On August 14 she had a V. fib which was treated with a 41 J shock. Denies being aware of receiving treatment/shocks from the ICD. States she was last \"shocked\" about 3 months ago. Patient is followed by cardiologist Joaquin Tyler in [place]. Patient is afebrile, blood pressure within normal limit. Saturating above 95% while breathing ambient air however she appears tachypneic. Pertinent blood work finding including hypokalemia of 2.6, serum magnesium is 2.1.  proBNP greater than 9000, normal troponin. Chest x-ray shows vascular congestion. Spoke with daughter Hilton Webb over the phone [phone]  Past Medical History:   Diagnosis Date    CHF [facility]congestive heart failure) ([facility] [address]     Hypertension       Past Surgical History:   Procedure Laterality Date    HX BIV-IMPLANTABLE CARDIOVERTER DEFIBRILLATOR        Prior to Admission medications    Not on File   Carvedilol, Entresto, Lasix, Aldactone, Synthroid  No Known Allergies   Social History     Tobacco Use    Smoking status: Former Smoker    Smokeless tobacco: Never Used   Substance Use Topics    Alcohol use: Yes     Comment: sip of wine at times       History reviewed. No pertinent family history. Review of Systems   Constitutional: Positive for fatigue.  Negative for appetite change, chills, diaphoresis and fever. HENT: Negative. Eyes: Negative for pain and visual disturbance. Respiratory: Positive for cough and shortness of breath. Cardiovascular: Negative for chest pain, palpitations and leg swelling. Gastrointestinal: Negative. Endocrine: Negative. Genitourinary: Negative. Neurological: Positive for syncope and light-headedness. Negative for seizures, facial asymmetry, speech difficulty, weakness, numbness and headaches. Objective:    08/18 1901 - 08/19 0700  In: 50 [I.V.:50]  Out: -   No intake/output data recorded. Patient Vitals for the past 8 hrs:   BP Temp Pulse Resp SpO2 Height Weight   08/19/21 0030 127/84  80 16 97 %     08/18/21 2054 (!) 121/99 98.1 °F (36.7 °C) 88 15 98 % 5' (1.524 m) 74.8 kg (165 lb)     Physical Exam  Constitutional:       General: She is not in acute distress. Appearance: Normal appearance. HENT:      Head: Normocephalic and atraumatic. Mouth/Throat:      Mouth: Mucous membranes are moist.      Pharynx: Oropharynx is clear. Eyes:      Extraocular Movements: Extraocular movements intact. Conjunctiva/sclera: Conjunctivae normal.      Pupils: Pupils are equal, round, and reactive to light. Cardiovascular:      Rate and Rhythm: Normal rate. Pulses: Normal pulses. Heart sounds: Normal heart sounds. No murmur heard. No friction rub. No gallop. Pulmonary:      Breath sounds: Rales present. Comments: Mild to moderate tachypnea. Bibasilar crackles. No wheezing  Abdominal:      General: Abdomen is flat. Palpations: Abdomen is soft. Musculoskeletal:      Cervical back: Normal range of motion and neck supple. Skin:     General: Skin is warm and dry. Neurological:      General: No focal deficit present. Mental Status: She is alert and oriented to person, place, and time. Mental status is at baseline. Cranial Nerves: No cranial nerve deficit. Motor: No weakness. Labs:    Recent Results (from the past 24 hour(s))   CBC WITH AUTOMATED DIFF    Collection Time: 08/18/21  9:00 PM   Result Value Ref Range    WBC 5.1 3.6 - 11.0 K/uL    RBC 3.96 3.80 - 5.20 M/uL    HGB 12.2 11.5 - 16.0 g/dL    HCT 37.2 35.0 - 47.0 %    MCV 93.9 80.0 - 99.0 FL    MCH 30.8 26.0 - 34.0 PG    MCHC 32.8 30.0 - 36.5 g/dL    RDW 14.1 11.5 - 14.5 %    PLATELET 158 326 - 268 K/uL    MPV 10.0 8.9 - 12.9 FL    NRBC 0.0 0.0  WBC    ABSOLUTE NRBC 0.00 0.00 - 0.01 K/uL    NEUTROPHILS 38 32 - 75 %    LYMPHOCYTES 47 12 - 49 %    MONOCYTES 12 5 - 13 %    EOSINOPHILS 2 0 - 7 %    BASOPHILS 1 0 - 1 %    IMMATURE GRANULOCYTES 0 0 - 0.5 %    ABS. NEUTROPHILS 2.0 1.8 - 8.0 K/UL    ABS. LYMPHOCYTES 2.4 0.8 - 3.5 K/UL    ABS. MONOCYTES 0.6 0.0 - 1.0 K/UL    ABS. EOSINOPHILS 0.1 0.0 - 0.4 K/UL    ABS. BASOPHILS 0.0 0.0 - 0.1 K/UL    ABS. IMM. GRANS. 0.0 0.00 - 0.04 K/UL    DF AUTOMATED     METABOLIC PANEL, COMPREHENSIVE    Collection Time: 08/18/21  9:00 PM   Result Value Ref Range    Sodium 140 136 - 145 mmol/L    Potassium 2.6 (LL) 3.5 - 5.1 mmol/L    Chloride 105 97 - 108 mmol/L    CO2 30 21 - 32 mmol/L    Anion gap 5 5 - 15 mmol/L    Glucose 120 (H) 65 - 100 mg/dL    BUN 18 6 - 20 mg/dL    Creatinine 0.98 0.55 - 1.02 mg/dL    BUN/Creatinine ratio 18 12 - 20      GFR est AA >60 >60 ml/min/1.73m2    GFR est non-AA 55 (L) >60 ml/min/1.73m2    Calcium 8.6 8.5 - 10.1 mg/dL    Bilirubin, total 1.2 (H) 0.2 - 1.0 mg/dL    AST (SGOT) 18 15 - 37 U/L    ALT (SGPT) 23 12 - 78 U/L    Alk.  phosphatase 47 45 - 117 U/L    Protein, total 6.8 6.4 - 8.2 g/dL    Albumin 3.4 (L) 3.5 - 5.0 g/dL    Globulin 3.4 2.0 - 4.0 g/dL    A-G Ratio 1.0 (L) 1.1 - 2.2     CK W/ REFLX CKMB    Collection Time: 08/18/21  9:00 PM   Result Value Ref Range    CK 70.0 26 - 192 ng/mL   TROPONIN I    Collection Time: 08/18/21  9:00 PM   Result Value Ref Range    Troponin-I, Qt. <0.05 <0.05 ng/mL   MAGNESIUM    Collection Time: 08/18/21  9:00 PM Result Value Ref Range    Magnesium 2.1 1.6 - 2.4 mg/dL   BNP    Collection Time: 08/18/21  9:00 PM   Result Value Ref Range    NT pro-BNP 9,974 (H) <450 pg/mL   TROPONIN I    Collection Time: 08/19/21 12:07 AM   Result Value Ref Range    Troponin-I, Qt. <0.05 <0.05 ng/mL         Assessment:  Active Problems:    Hypokalemia (8/19/2021)      Syncope (8/19/2021)      VT (ventricular tachycardia) ([address] (8/19/2021)      Acute CHF (congestive heart failure) ([address] (8/19/2021)      Frequent nonsustained VT  -Nonsustained VT and VF on AICD interrogation  -14 J cardioversion delivered on 8/14/2021. No ATP or shocks today  -Hypokalemic, normal magnesium  -Receiving IV magnesium and KCl  -Please call cardiology consult during the day  -Obtain records from primary cardiologist in [facility]    Syncope  -Likely due to above    Acute HFrEF  -Status post AICD  -IV Lasix  -Intake, output monitoring. Daily weight    Hypokalemia  -Potassium is 2.6  -IV and p.o. KCl. Follow-up BMP    Hypothyroidism  -On Synthroid    Need for medication reconciliation  -Unable to verify home meds. She states to be on Entresto, carvedilol, Synthroid, furosemide  and Aldactone. Dose unknown.     DVT prophylaxis:  Lovenox      Full code    Signed:  Quentin Torres MD 8/19/2021

## 2021-08-20 ENCOUNTER — APPOINTMENT (OUTPATIENT)
Dept: NON INVASIVE DIAGNOSTICS | Age: 76
DRG: 640 | End: 2021-08-20
Attending: INTERNAL MEDICINE
Payer: MEDICARE

## 2021-08-20 LAB
ANION GAP SERPL CALC-SCNC: 8 MMOL/L (ref 5–15)
ATRIAL RATE: 84 BPM
ATRIAL RATE: 86 BPM
BUN SERPL-MCNC: 24 MG/DL (ref 6–20)
BUN/CREAT SERPL: 15 (ref 12–20)
CA-I BLD-MCNC: 8.9 MG/DL (ref 8.5–10.1)
CALCULATED P AXIS, ECG09: 59 DEGREES
CALCULATED P AXIS, ECG09: 84 DEGREES
CALCULATED R AXIS, ECG10: 139 DEGREES
CALCULATED R AXIS, ECG10: 142 DEGREES
CALCULATED T AXIS, ECG11: 53 DEGREES
CALCULATED T AXIS, ECG11: 94 DEGREES
CHLORIDE SERPL-SCNC: 101 MMOL/L (ref 97–108)
CO2 SERPL-SCNC: 30 MMOL/L (ref 21–32)
CREAT SERPL-MCNC: 1.6 MG/DL (ref 0.55–1.02)
DIAGNOSIS, 93000: NORMAL
DIAGNOSIS, 93000: NORMAL
ECHO AO ASC DIAM: 3.3 CM
ECHO AO ROOT DIAM: 3.7 CM
ECHO AV AREA PEAK VELOCITY: 2.49 CM2
ECHO AV AREA VTI: 2.35 CM2
ECHO AV AREA/BSA PEAK VELOCITY: 1.6 CM2/M2
ECHO AV AREA/BSA VTI: 1.5 CM2/M2
ECHO AV MEAN GRADIENT: 3 MMHG
ECHO AV MEAN VELOCITY: 73.9 CM/S
ECHO AV PEAK GRADIENT: 5 MMHG
ECHO AV PEAK VELOCITY: 108 CM/S
ECHO AV VTI: 16.5 CM
ECHO EST RA PRESSURE: 10 MMHG
ECHO LA AREA 2C: 26.4 CM2
ECHO LA AREA 4C: 26.5 CM2
ECHO LA MAJOR AXIS: 5 CM
ECHO LA MINOR AXIS: 3.18 CM
ECHO LV E' SEPTAL VELOCITY: 4.34 CM/S
ECHO LV EDV A2C: 138 CM3
ECHO LV EDV A4C: 165 CM3
ECHO LV ESV A2C: 125 CM3
ECHO LV ESV A4C: 174 CM3
ECHO LV INTERNAL DIMENSION DIASTOLIC MMODE: 7.44 CM
ECHO LV INTERNAL DIMENSION DIASTOLIC: 6.57 CM (ref 3.9–5.3)
ECHO LV INTERNAL DIMENSION SYSTOLIC MMODE: 6.23 CM
ECHO LV INTERNAL DIMENSION SYSTOLIC: 5 CM
ECHO LV IVSD MMODE: 1.39 CM
ECHO LV IVSD: 0.95 CM (ref 0.6–0.9)
ECHO LV MASS 2D: 345.3 G (ref 67–162)
ECHO LV MASS INDEX 2D: 219.9 G/M2 (ref 43–95)
ECHO LV POSTERIOR WALL DIASTOLIC MMODE: 1.21 CM
ECHO LV POSTERIOR WALL DIASTOLIC: 1.35 CM (ref 0.6–0.9)
ECHO LVOT DIAM: 2.2 CM
ECHO LVOT PEAK GRADIENT: 2 MMHG
ECHO LVOT PEAK VELOCITY: 70.7 CM/S
ECHO LVOT SV: 170 CM3
ECHO LVOT SV: 39 CM3
ECHO LVOT VTI: 10.2 CM
ECHO MV A VELOCITY: 70.6 CM/S
ECHO MV AREA PHT: 3.24 CM2
ECHO MV E VELOCITY: 104 CM/S
ECHO MV E/A RATIO: 1.47
ECHO MV E/E' SEPTAL: 23.96
ECHO MV MAX VELOCITY: 111 CM/S
ECHO MV MEAN GRADIENT: 1 MMHG
ECHO MV PEAK GRADIENT: 5 MMHG
ECHO MV PRESSURE HALF TIME (PHT): 68 MS
ECHO MV REGURGITANT VTIA: 186 CM
ECHO MV VTI: 32.9 CM
ECHO PV MAX VELOCITY: 69.8 CM/S
ECHO PV MEAN GRADIENT: 1 MMHG
ECHO PV PEAK INSTANTANEOUS GRADIENT SYSTOLIC: 5 MMHG
ECHO PV REGURGITANT MAX VELOCITY: 110 CM/S
ECHO PV VTI: 11.2 CM
ECHO RIGHT VENTRICULAR SYSTOLIC PRESSURE (RVSP): 53 MMHG
ECHO RVOT DIAMETER: 2.5 CM
ECHO TV MAX VELOCITY: 326 CM/S
ECHO TV MEAN GRADIENT: 56 MMHG
ECHO TV REGURGITANT PEAK GRADIENT: 43 MMHG
GLUCOSE SERPL-MCNC: 117 MG/DL (ref 65–100)
LA VOL DISK BP: 83 CM3 (ref 22–52)
LVOT MG: 1 MMHG
MV DEC SLOPE: 4250 MM/S2
MV DEC SLOPE: 4250 MM/S2
P-R INTERVAL, ECG05: 122 MS
POTASSIUM SERPL-SCNC: 3.9 MMOL/L (ref 3.5–5.1)
Q-T INTERVAL, ECG07: 486 MS
Q-T INTERVAL, ECG07: 516 MS
QRS DURATION, ECG06: 188 MS
QRS DURATION, ECG06: 216 MS
QTC CALCULATION (BEZET), ECG08: 581 MS
QTC CALCULATION (BEZET), ECG08: 609 MS
SODIUM SERPL-SCNC: 139 MMOL/L (ref 136–145)
VENTRICULAR RATE, ECG03: 84 BPM
VENTRICULAR RATE, ECG03: 86 BPM

## 2021-08-20 PROCEDURE — 74011000258 HC RX REV CODE- 258: Performed by: INTERNAL MEDICINE

## 2021-08-20 PROCEDURE — 74011250637 HC RX REV CODE- 250/637: Performed by: HOSPITALIST

## 2021-08-20 PROCEDURE — 77010033678 HC OXYGEN DAILY

## 2021-08-20 PROCEDURE — 74011250637 HC RX REV CODE- 250/637: Performed by: INTERNAL MEDICINE

## 2021-08-20 PROCEDURE — 74011250636 HC RX REV CODE- 250/636: Performed by: INTERNAL MEDICINE

## 2021-08-20 PROCEDURE — 93306 TTE W/DOPPLER COMPLETE: CPT

## 2021-08-20 PROCEDURE — 65270000029 HC RM PRIVATE

## 2021-08-20 PROCEDURE — 80048 BASIC METABOLIC PNL TOTAL CA: CPT

## 2021-08-20 PROCEDURE — 36415 COLL VENOUS BLD VENIPUNCTURE: CPT

## 2021-08-20 PROCEDURE — 74011250636 HC RX REV CODE- 250/636: Performed by: HOSPITALIST

## 2021-08-20 RX ORDER — AMIODARONE HYDROCHLORIDE 200 MG/1
200 TABLET ORAL DAILY
Status: DISCONTINUED | OUTPATIENT
Start: 2021-08-20 | End: 2021-08-22

## 2021-08-20 RX ADMIN — ASPIRIN 81 MG: 81 TABLET, COATED ORAL at 08:48

## 2021-08-20 RX ADMIN — FAMOTIDINE 10 MG: 10 TABLET ORAL at 21:30

## 2021-08-20 RX ADMIN — Medication 10 ML: at 13:08

## 2021-08-20 RX ADMIN — ENOXAPARIN SODIUM 40 MG: 40 INJECTION SUBCUTANEOUS at 08:49

## 2021-08-20 RX ADMIN — Medication 10 ML: at 06:48

## 2021-08-20 RX ADMIN — Medication 10 ML: at 21:30

## 2021-08-20 RX ADMIN — ONDANSETRON 4 MG: 2 INJECTION INTRAMUSCULAR; INTRAVENOUS at 06:48

## 2021-08-20 RX ADMIN — LEVOTHYROXINE SODIUM 100 MCG: 0.1 TABLET ORAL at 06:48

## 2021-08-20 RX ADMIN — FAMOTIDINE 10 MG: 10 TABLET ORAL at 08:48

## 2021-08-20 RX ADMIN — AMIODARONE HYDROCHLORIDE 0.5 MG/MIN: 50 INJECTION, SOLUTION INTRAVENOUS at 06:48

## 2021-08-20 RX ADMIN — FUROSEMIDE 40 MG: 10 INJECTION, SOLUTION INTRAMUSCULAR; INTRAVENOUS at 08:48

## 2021-08-20 RX ADMIN — AMIODARONE HYDROCHLORIDE 200 MG: 200 TABLET ORAL at 11:00

## 2021-08-20 NOTE — PROGRESS NOTES
Hospitalist Progress Note               Daily Progress Note: 8/20/2021      Subjective: The patient is seen for follow  up. 53-year-old female with a history of hypertension, hypothyroidism, congestive heart failure status post AICD placement was admitted to the hospital with a syncopal episode. Patient was found to have nonsustained V. tach and multiple AICD firing on interrogation in the ER. Patient was started on amiodarone drip. Patient is comfortable now. She was also noticed to have hypokalemia which is improved after supplementation. Medications reviewed  Current Facility-Administered Medications   Medication Dose Route Frequency    amiodarone (CORDARONE) tablet 200 mg  200 mg Oral DAILY    sodium chloride (NS) flush 5-40 mL  5-40 mL IntraVENous Q8H    sodium chloride (NS) flush 5-40 mL  5-40 mL IntraVENous PRN    polyethylene glycol (MIRALAX) packet 17 g  17 g Oral DAILY PRN    enoxaparin (LOVENOX) injection 40 mg  40 mg SubCUTAneous DAILY    acetaminophen (TYLENOL) suppository 650 mg  650 mg Rectal Q6H PRN    Or    acetaminophen (TYLENOL) tablet 650 mg  650 mg Oral Q6H PRN    furosemide (LASIX) injection 40 mg  40 mg IntraVENous DAILY    aspirin delayed-release tablet 81 mg  81 mg Oral DAILY    carvediloL (COREG) tablet 12.5 mg  12.5 mg Oral BID WITH MEALS    levothyroxine (SYNTHROID) tablet 100 mcg  100 mcg Oral ACB    spironolactone (ALDACTONE) tablet 25 mg  25 mg Oral DAILY    famotidine (PEPCID) tablet 10 mg  10 mg Oral BID    hydrOXYzine pamoate (VISTARIL) capsule 25 mg  25 mg Oral Q6H PRN    ondansetron (ZOFRAN) injection 4 mg  4 mg IntraVENous Q4H PRN       Review of Systems:   A comprehensive review of systems was negative.     Objective:   Physical Exam:     Visit Vitals  /75   Pulse 60   Temp 97.6 °F (36.4 °C)   Resp 20   Ht 5' 1\" (1.549 m)   Wt 59.1 kg (130 lb 4.7 oz)   SpO2 98%   BMI 24.62 kg/m²    O2 Flow Rate (L/min): 2 l/min O2 Device: Nasal cannula    Temp (24hrs), Av.9 °F (36.6 °C), Min:97.6 °F (36.4 °C), Max:98.3 °F (36.8 °C)    No intake/output data recorded.  1901 -  0700  In: 839 [I.V.:839]  Out: 1650 [Urine:1650]    PHYSICAL EXAM:  Skin: Extremities and face reveal no rashes. HEENT: Sclerae anicteric. Extra-occular muscles are intact. No oral ulcers. No ENT discharge. The neck is supple. Cardiovascular: Regular rate and rhythm. No murmurs, gallops, or rubs. PMI nondisplaced. Carotids without bruits. Respiratory: Comfortable breathing with no accessory muscle use. Clear breath sounds with no wheezes, rales, or rhonchi. GI: Abdomen nondistended, soft, and nontender. Normal active bowel sounds. No enlargement of the liver or spleen. No masses palpable. Rectal: Deferred   Musculoskeletal: No pitting edema of the lower legs. Extremities have good range of motion. No costovertebral tenderness. Neurological: Gross memory appears intact. Patient is alert and oriented. Power 5/5, Cranial nerves II-XII  intact  Psychiatric: Mood appears appropriate with judgement intact. Data Review:       Recent Days:  Recent Labs     21  0530 21  2100   WBC 4.1 5.1   HGB 13.4 12.2   HCT 42.3 37.2    205     Recent Labs     21  0530 21  2100    140   K 4.0 2.6*   * 105   CO2 29 30   GLU 87 120*   BUN 15 18   CREA 0.86 0.98   CA 7.9* 8.6   MG  --  2.1   ALB  --  3.4*   TBILI  --  1.2*   ALT  --  23     No results for input(s): PH, PCO2, PO2, HCO3, FIO2 in the last 72 hours. XR CHEST PORT   Final Result   Enlarged cardiac silhouette. Central pulmonary vascular congestion.              Assessment/     Problem List:  Hospital Problems  Never Reviewed        Codes Class Noted POA    Hypokalemia ICD-10-CM: E87.6  ICD-9-CM: 276.8  2021 Unknown        Syncope ICD-10-CM: R55  ICD-9-CM: 780.2  2021 Unknown        [facility] ICD-10-CM: I47.2  ICD-9-CM: 427.1  2021 Unknown Acute CHF (congestive heart failure) ([facility] [address] ICD-10-CM: I50.9  ICD-9-CM: 428.0  8/19/2021 Unknown                     Plan:  Frequent nonsustained VT  -Nonsustained VT and VF on AICD interrogation  -14 J cardioversion delivered on 8/14/2021. No ATP or shocks   -Hypokalemic- improved, normal magnesium  -Receiving IV magnesium and KCl  -Please call cardiology consult during the day  - Follow up on echo  - transition to PO amiodarone.  -follow with primary cardiologist in [place] on discharge     Syncope  -Likely due to above     Acute HFrEF  -Status post AICD  -IV Lasix  -Intake, output monitoring. Daily weight     Hypokalemia  -Potassiumwas 2.6  -IV and p.o. KCl.    _ monitor BMP     Hypothyroidism  -On Synthroid- restarted     Needed for medication reconciliation  Med rec done    Safety:  Code Status: Full Code   DVT prophylaxis:lovenox  Stress Ulcer prophylaxis:pepcid  Family Contact Info:    Disposition:home  Consults: Cardiology    Care Plan discussed with: Patient/Family and Nurse  Patient can be transferred to tele floor.     Marisa Alarcon MD

## 2021-08-20 NOTE — PROGRESS NOTES
Progress Note    Patient: Candi Bonilla MRN: [id number]  SSN: [id number]    YOB: 1945  Age: 64 y.o. Sex: female      Admit Date: 8/18/2021    LOS: 1 day     Subjective:     She denied having any chest pain or shortness of breath    Objective:     Vitals:    08/20/21 0730 08/20/21 0740 08/20/21 0800 08/20/21 0848   BP:   108/75 102/75   Pulse:   66 60   Resp:       Temp:       SpO2: (!) 78% 94% 100%    Weight:       Height:            Intake and Output:  Current Shift: No intake/output data recorded. Last three shifts: 08/18 1901 - 08/20 0700  In: 839 [I.V.:839]  Out: 1650 [Urine:1650]    Physical Exam:   General:  Alert, cooperative, no distress, appears stated age. Eyes:  Conjunctivae/corneas clear. PERRL, EOMs intact. Fundi benign   Ears:  Normal TMs and external ear canals both ears. Nose: Nares normal. Septum midline. Mucosa normal. No drainage or sinus tenderness. Mouth/Throat: Lips, mucosa, and tongue normal. Teeth and gums normal.   Neck: Supple, symmetrical, trachea midline, no adenopathy, thyroid: no enlargment/tenderness/nodules, no carotid bruit and no JVD. Back:   Symmetric, no curvature. ROM normal. No CVA tenderness. Lungs:   Clear to auscultation bilaterally. Heart:  Regular rate and rhythm, S1, S2 normal, no murmur, click, rub or gallop. Abdomen:   Soft, non-tender. Bowel sounds normal. No masses,  No organomegaly. Extremities: Extremities normal, atraumatic, no cyanosis or edema. Pulses: 2+ and symmetric all extremities. Skin: Skin color, texture, turgor normal. No rashes or lesions   Lymph nodes: Cervical, supraclavicular, and axillary nodes normal.   Neurologic: CNII-XII intact. Normal strength, sensation and reflexes throughout. Lab/Data Review: All lab results for the last 24 hours reviewed.          Assessment:     Active Problems:    Hypokalemia (8/19/2021)      Syncope (8/19/2021)      VT (ventricular tachycardia) ([address] (8/19/2021)      Acute CHF (congestive heart failure) ([facility] [address] (8/19/2021)    Patient is a 73-year-old -American female with:  1. Syncope  2. Heart failure with reduced ejection fraction status post BiV ICD, follows up with [facility]  3. Hypothyroidism  4. Hypertension  5.  V. fib treated with shock recently. 6.    Plan:     Her sats are on the lower side. Currently on aspirin, carvedilol, IV Lasix, and spironolactone. We will switch amiodarone to oral amiodarone. Labs are pending this morning.   Echocardiogram pending  Signed By: Ortiz Valle MD     August 20, 2021

## 2021-08-21 LAB
ANION GAP SERPL CALC-SCNC: 7 MMOL/L (ref 5–15)
BUN SERPL-MCNC: 29 MG/DL (ref 6–20)
BUN/CREAT SERPL: 19 (ref 12–20)
CA-I BLD-MCNC: 8.8 MG/DL (ref 8.5–10.1)
CHLORIDE SERPL-SCNC: 102 MMOL/L (ref 97–108)
CO2 SERPL-SCNC: 28 MMOL/L (ref 21–32)
CREAT SERPL-MCNC: 1.52 MG/DL (ref 0.55–1.02)
GLUCOSE SERPL-MCNC: 112 MG/DL (ref 65–100)
POTASSIUM SERPL-SCNC: 3.7 MMOL/L (ref 3.5–5.1)
SODIUM SERPL-SCNC: 137 MMOL/L (ref 136–145)

## 2021-08-21 PROCEDURE — 77010033678 HC OXYGEN DAILY

## 2021-08-21 PROCEDURE — 74011250636 HC RX REV CODE- 250/636: Performed by: INTERNAL MEDICINE

## 2021-08-21 PROCEDURE — 36415 COLL VENOUS BLD VENIPUNCTURE: CPT

## 2021-08-21 PROCEDURE — 80048 BASIC METABOLIC PNL TOTAL CA: CPT

## 2021-08-21 PROCEDURE — 74011250637 HC RX REV CODE- 250/637: Performed by: HOSPITALIST

## 2021-08-21 PROCEDURE — 65270000029 HC RM PRIVATE

## 2021-08-21 PROCEDURE — 74011250637 HC RX REV CODE- 250/637: Performed by: INTERNAL MEDICINE

## 2021-08-21 RX ORDER — SPIRONOLACTONE 25 MG/1
25 TABLET ORAL DAILY
Qty: 30 TABLET | Refills: 0 | Status: SHIPPED | OUTPATIENT
Start: 2021-08-21

## 2021-08-21 RX ORDER — POTASSIUM CHLORIDE 1.5 G/1.77G
20 POWDER, FOR SOLUTION ORAL DAILY
Qty: 14 PACKET | Refills: 0 | Status: SHIPPED | OUTPATIENT
Start: 2021-08-21 | End: 2021-09-04

## 2021-08-21 RX ORDER — SACUBITRIL AND VALSARTAN 24; 26 MG/1; MG/1
1 TABLET, FILM COATED ORAL DAILY
Qty: 30 TABLET | Refills: 0 | Status: SHIPPED | OUTPATIENT
Start: 2021-08-21

## 2021-08-21 RX ADMIN — LEVOTHYROXINE SODIUM 100 MCG: 0.1 TABLET ORAL at 08:06

## 2021-08-21 RX ADMIN — AMIODARONE HYDROCHLORIDE 200 MG: 200 TABLET ORAL at 11:28

## 2021-08-21 RX ADMIN — FAMOTIDINE 10 MG: 10 TABLET ORAL at 08:06

## 2021-08-21 RX ADMIN — CARVEDILOL 12.5 MG: 12.5 TABLET, FILM COATED ORAL at 16:54

## 2021-08-21 RX ADMIN — Medication 10 ML: at 05:56

## 2021-08-21 RX ADMIN — Medication 10 ML: at 16:55

## 2021-08-21 RX ADMIN — CARVEDILOL 12.5 MG: 12.5 TABLET, FILM COATED ORAL at 08:06

## 2021-08-21 RX ADMIN — ENOXAPARIN SODIUM 40 MG: 40 INJECTION SUBCUTANEOUS at 08:06

## 2021-08-21 RX ADMIN — Medication 10 ML: at 21:03

## 2021-08-21 RX ADMIN — FAMOTIDINE 10 MG: 10 TABLET ORAL at 20:41

## 2021-08-21 RX ADMIN — ASPIRIN 81 MG: 81 TABLET, COATED ORAL at 08:06

## 2021-08-21 NOTE — PROGRESS NOTES
Hospitalist Progress Note               Daily Progress Note: 8/21/2021      Subjective: The patient is seen for follow  up. 64-year-old female with a history of hypertension, hypothyroidism, congestive heart failure status post AICD placement was admitted to the hospital with a syncopal episode. Patient was found to have nonsustained V. tach and multiple AICD firing on interrogation in the ER. Patient was started on amiodarone drip. Patient is comfortable now. She was also noticed to have hypokalemia which is improved after supplementation. Patient states that she had 2 episodes of some dizziness last night. Called the patient's nurseno such events reported to the nursing staff. Medications reviewed  Current Facility-Administered Medications   Medication Dose Route Frequency    amiodarone (CORDARONE) tablet 200 mg  200 mg Oral DAILY    sodium chloride (NS) flush 5-40 mL  5-40 mL IntraVENous Q8H    sodium chloride (NS) flush 5-40 mL  5-40 mL IntraVENous PRN    polyethylene glycol (MIRALAX) packet 17 g  17 g Oral DAILY PRN    enoxaparin (LOVENOX) injection 40 mg  40 mg SubCUTAneous DAILY    acetaminophen (TYLENOL) suppository 650 mg  650 mg Rectal Q6H PRN    Or    acetaminophen (TYLENOL) tablet 650 mg  650 mg Oral Q6H PRN    furosemide (LASIX) injection 40 mg  40 mg IntraVENous DAILY    aspirin delayed-release tablet 81 mg  81 mg Oral DAILY    carvediloL (COREG) tablet 12.5 mg  12.5 mg Oral BID WITH MEALS    levothyroxine (SYNTHROID) tablet 100 mcg  100 mcg Oral ACB    spironolactone (ALDACTONE) tablet 25 mg  25 mg Oral DAILY    famotidine (PEPCID) tablet 10 mg  10 mg Oral BID    hydrOXYzine pamoate (VISTARIL) capsule 25 mg  25 mg Oral Q6H PRN    ondansetron (ZOFRAN) injection 4 mg  4 mg IntraVENous Q4H PRN       Review of Systems:   A comprehensive review of systems was negative.     Objective:   Physical Exam:     Visit Vitals  /70   Pulse 62   Temp 98.4 °F (36.9 °C)   Resp 18 Ht 5' 1\" (1.549 m)   Wt 59.1 kg (130 lb 4.7 oz)   SpO2 99%   BMI 24.62 kg/m²    O2 Flow Rate (L/min): 2 l/min O2 Device: Nasal cannula    Temp (24hrs), Av.5 °F (36.9 °C), Min:98.4 °F (36.9 °C), Max:98.7 °F (37.1 °C)    No intake/output data recorded.  1901 -  0700  In: 230 [I.V.:230]  Out: 550 [Urine:550]    PHYSICAL EXAM:  Skin: Extremities and face reveal no rashes. HEENT: Sclerae anicteric. Extra-occular muscles are intact. No oral ulcers. No ENT discharge. The neck is supple. Cardiovascular: Regular rate and rhythm. No murmurs, gallops, or rubs. PMI nondisplaced. Carotids without bruits. Respiratory: Comfortable breathing with no accessory muscle use. Clear breath sounds with no wheezes, rales, or rhonchi. GI: Abdomen nondistended, soft, and nontender. Normal active bowel sounds. No enlargement of the liver or spleen. No masses palpable. Rectal: Deferred   Musculoskeletal: No pitting edema of the lower legs. Extremities have good range of motion. No costovertebral tenderness. Neurological: Gross memory appears intact. Patient is alert and oriented. Power 5/5, Cranial nerves II-XII  intact      Data Review:       Recent Days:  Recent Labs     21  0530 21  2100   WBC 4.1 5.1   HGB 13.4 12.2   HCT 42.3 37.2    205     Recent Labs     21  0423 21  1110 21  0530 21  2100 21  2100    139 144   < > 140   K 3.7 3.9 4.0   < > 2.6*    101 110*   < > 105   CO2 28 30 29   < > 30   * 117* 87   < > 120*   BUN 29* 24* 15   < > 18   CREA 1.52* 1.60* 0.86   < > 0.98   CA 8.8 8.9 7.9*   < > 8.6   MG  --   --   --   --  2.1   ALB  --   --   --   --  3.4*   TBILI  --   --   --   --  1.2*   ALT  --   --   --   --  23    < > = values in this interval not displayed. No results for input(s): PH, PCO2, PO2, HCO3, FIO2 in the last 72 hours. XR CHEST PORT   Final Result   Enlarged cardiac silhouette.  Central pulmonary vascular congestion. Assessment/     Problem List:  Hospital Problems  Never Reviewed        Codes Class Noted POA    Hypokalemia ICD-10-CM: E87.6  ICD-9-CM: 276.8  8/19/2021 Unknown        Syncope ICD-10-CM: R55  ICD-9-CM: 780.2  8/19/2021 Unknown        VT (ventricular tachycardia) ([facility]) ICD-10-CM: I47.2  ICD-9-CM: 427.1  8/19/2021 Unknown        Acute CHF (congestive heart failure) ([facility] [address] ICD-10-CM: I50.9  ICD-9-CM: 428.0  8/19/2021 Unknown                     Plan:  Frequent nonsustained VT  -Nonsustained VT and VF on AICD interrogation  -14 J cardioversion delivered on 8/14/2021. No ATP or shocks   -Hypokalemic- improved, normal magnesium  -Received IV magnesium and KCl  - Follow up on echo  - transition to PO amiodarone.  -follow with primary cardiologist in [place] on discharge  - Cradiology eval appreciated     Syncope  -Likely due to above     Acute HFrEF  -Status post AICD  -IV Lasix  -Intake, output monitoring. Daily weight     Hypokalemia  -Potassiumwas 2.6  -IV and p.o. KCl.    _ monitor BMP     Hypothyroidism  -On Synthroid- restarted     Needed for medication reconciliation  Med rec done    Safety:  Code Status: Full Code   DVT prophylaxis:lovenox  Stress Ulcer prophylaxis:pepcid  Family Contact Info:    Disposition:home likely tomorrow  Consults: Cardiology    Care Plan discussed with: Patient/Family and Nurse  Patient can be transferred to tele floor.     Erasto Hinton MD

## 2021-08-21 NOTE — PROGRESS NOTES
Pt states, \"I cant pee. The last time I used the bathroom was around 12, and I only put out a little bit\". Pt is in obvious discomfort. Bladder scan volume is 134 ml. Keshawn Barboza MD made aware of pt complaint and creatinine trend upwards. Per [address], if pt does not urinate within an hour, it is ok to use a straight catheter to relieve patient.

## 2021-08-21 NOTE — PROGRESS NOTES
Pt in bed. Vitals signs stable. Av paced rhythm. Running low 60s heart rate. With 725'P systolic  Sometimes yells out when asleep.

## 2021-08-21 NOTE — PROGRESS NOTES
Pt transferred to rm [id number] accompanied by RN, on remote telemetry and 2LNC. In no acute distress.  Report given to Ry Guzman

## 2021-08-21 NOTE — PROGRESS NOTES
Progress Note      8/21/2021 10:55 AM  NAME: Octavio Mendoza   MRN:  [id number]   Admit Diagnosis: Syncope [R55]  Acute CHF (congestive heart failure) ([facility] [I50.9]  [facility] [I47.2]  Hypokalemia [E87.6]      Problem List:   -Syncope  -Heart failure status post BiV ICD. -Ejection fraction of 15 to 20%  -Moderate to severe mitral regurgitation  -Hypertension  -Hypothyroidism  -History of V. fib arrest treated with shock status post AICD implantation     Assessment/Plan:   -68-year-old -American female sitting up in the bed and denies any symptoms this morning.  -Patient was admitted to the hospital with acute on chronic systolic heart failure probably secondary to underlying mitral regurgitation her last known ejection fraction was 15 to 20%. -She is status post AICD implantation secondary to low ejection fraction and V. fib arrest in the past.  -Otherwise clinically and hemodynamically stable and denies any symptoms on today's examination.  -Continue current conservative medical management and to optimize guideline directed medical management at this time.  -No further cardiovascular testing as it would not change my cardiac management. []       High complexity decision making was performed in this patient at high risk for decompensation with multiple organ involvement. Subjective:     Octavio Mendoza is a 68-year-old -American female with history of cardiomyopathy with last known ejection fraction of 15 to 20% and severe mitral regurgitation status post AICD implantation secondary to V. fib arrest.  She is sitting up in the chair this morning and denies any symptoms and otherwise clinically and hemodynamically stable. We will continue current conservative medical management with no further cardiovascular testing as it would not change my cardiac management. Discussed with RN events overnight.      Review of Systems: Review of the system pertinent to cardiovascular symptoms are unremarkable. Symptom Y/N Comments  Symptom Y/N Comments   Fever/Chills N   Chest Pain N    Poor Appetite N   Edema N    Cough N   Abdominal Pain N    Sputum N   Joint Pain N    SOB/HORNER N   Pruritis/Rash N    Nausea/vomit N   Tolerating PT/OT Y    Diarrhea N   Tolerating Diet Y    Constipation N   Other       Could NOT obtain due to:      Objective:      Physical Exam:    Last 24hrs VS reviewed since prior progress note. Most recent are:    Visit Vitals  /75   Pulse 68   Temp 98.7 °F (37.1 °C)   Resp 24   Ht 5' 1\" (1.549 m)   Wt 59.1 kg (130 lb 4.7 oz)   SpO2 99%   BMI 24.62 kg/m²       Intake/Output Summary (Last 24 hours) at 8/21/2021 1055  Last data filed at 8/21/2021 2169  Gross per 24 hour   Intake    Output 550 ml   Net -550 ml        General Appearance: Well developed, well nourished, alert & oriented x 3,    no acute distress. Ears/Nose/Mouth/Throat: Hearing grossly normal.  Neck: Supple. Chest: Lungs clear to auscultation bilaterally. Cardiovascular: Regular rate and rhythm, S1S2 normal, no murmur. Abdomen: Soft, non-tender, bowel sounds are active. Extremities: No edema bilaterally. Skin: Warm and dry. []         Post-cath site without hematoma, bruit, tenderness, or thrill. Distal pulses intact. PMH/SH reviewed - no change compared to H&P    Data Review    Telemetry: normal sinus rhythm     EKG:   []  No new EKG for review  XR CHEST PORT   Final Result   Enlarged cardiac silhouette. Central pulmonary vascular congestion. Lab Data Personally Reviewed:    Recent Labs     08/19/21  0530 08/18/21 2100   WBC 4.1 5.1   HGB 13.4 12.2   HCT 42.3 37.2    205     No results for input(s): INR, PTP, APTT, INREXT in the last 72 hours.    Recent Labs     08/21/21  0423 08/20/21  1110 08/19/21  0530 08/18/21  2100 08/18/21  2100    139 144   < > 140   K 3.7 3.9 4.0   < > 2.6*    101 110*   < > 105   CO2 28 30 29   < > 30   BUN 29* 24* 15   < > 18   CREA 1.52* 1.60* 0.86   < > 0.98   * 117* 87   < > 120*   CA 8.8 8.9 7.9*   < > 8.6   MG  --   --   --   --  2.1    < > = values in this interval not displayed. Recent Labs     08/19/21  0007 08/18/21  2100   TROIQ <0.05 <0.05     No results found for: CHOL, CHOLX, CHLST, CHOLV, HDL, HDLP, LDL, LDLC, DLDLP, TGLX, TRIGL, TRIGP, CHHD, CHHDX    Recent Labs     08/18/21  2100   AP 47   TP 6.8   ALB 3.4*   GLOB 3.4     No results for input(s): PH, PCO2, PO2 in the last 72 hours.     Medications Personally Reviewed:    Current Facility-Administered Medications   Medication Dose Route Frequency    amiodarone (CORDARONE) tablet 200 mg  200 mg Oral DAILY    sodium chloride (NS) flush 5-40 mL  5-40 mL IntraVENous Q8H    sodium chloride (NS) flush 5-40 mL  5-40 mL IntraVENous PRN    polyethylene glycol (MIRALAX) packet 17 g  17 g Oral DAILY PRN    enoxaparin (LOVENOX) injection 40 mg  40 mg SubCUTAneous DAILY    acetaminophen (TYLENOL) suppository 650 mg  650 mg Rectal Q6H PRN    Or    acetaminophen (TYLENOL) tablet 650 mg  650 mg Oral Q6H PRN    furosemide (LASIX) injection 40 mg  40 mg IntraVENous DAILY    aspirin delayed-release tablet 81 mg  81 mg Oral DAILY    carvediloL (COREG) tablet 12.5 mg  12.5 mg Oral BID WITH MEALS    levothyroxine (SYNTHROID) tablet 100 mcg  100 mcg Oral ACB    spironolactone (ALDACTONE) tablet 25 mg  25 mg Oral DAILY    famotidine (PEPCID) tablet 10 mg  10 mg Oral BID    hydrOXYzine pamoate (VISTARIL) capsule 25 mg  25 mg Oral Q6H PRN    ondansetron (ZOFRAN) injection 4 mg  4 mg IntraVENous Q4H PRN         Sunny Sweeney MD

## 2021-08-22 VITALS
HEART RATE: 60 BPM | HEIGHT: 61 IN | WEIGHT: 134.48 LBS | OXYGEN SATURATION: 100 % | TEMPERATURE: 98.2 F | RESPIRATION RATE: 18 BRPM | BODY MASS INDEX: 25.39 KG/M2 | SYSTOLIC BLOOD PRESSURE: 110 MMHG | DIASTOLIC BLOOD PRESSURE: 73 MMHG

## 2021-08-22 LAB
ANION GAP SERPL CALC-SCNC: 7 MMOL/L (ref 5–15)
BUN SERPL-MCNC: 41 MG/DL (ref 6–20)
BUN/CREAT SERPL: 26 (ref 12–20)
CA-I BLD-MCNC: 9.3 MG/DL (ref 8.5–10.1)
CHLORIDE SERPL-SCNC: 100 MMOL/L (ref 97–108)
CO2 SERPL-SCNC: 29 MMOL/L (ref 21–32)
CREAT SERPL-MCNC: 1.57 MG/DL (ref 0.55–1.02)
GLUCOSE SERPL-MCNC: 98 MG/DL (ref 65–100)
POTASSIUM SERPL-SCNC: 3.7 MMOL/L (ref 3.5–5.1)
SODIUM SERPL-SCNC: 136 MMOL/L (ref 136–145)

## 2021-08-22 PROCEDURE — 97161 PT EVAL LOW COMPLEX 20 MIN: CPT | Performed by: PHYSICAL THERAPIST

## 2021-08-22 PROCEDURE — 74011250637 HC RX REV CODE- 250/637: Performed by: INTERNAL MEDICINE

## 2021-08-22 PROCEDURE — 74011250636 HC RX REV CODE- 250/636: Performed by: INTERNAL MEDICINE

## 2021-08-22 PROCEDURE — 97116 GAIT TRAINING THERAPY: CPT | Performed by: PHYSICAL THERAPIST

## 2021-08-22 PROCEDURE — 80048 BASIC METABOLIC PNL TOTAL CA: CPT

## 2021-08-22 PROCEDURE — 74011250637 HC RX REV CODE- 250/637: Performed by: HOSPITALIST

## 2021-08-22 PROCEDURE — 36415 COLL VENOUS BLD VENIPUNCTURE: CPT

## 2021-08-22 RX ORDER — AMIODARONE HYDROCHLORIDE 200 MG/1
200 TABLET ORAL 2 TIMES DAILY
Status: DISCONTINUED | OUTPATIENT
Start: 2021-08-22 | End: 2021-08-22 | Stop reason: HOSPADM

## 2021-08-22 RX ORDER — AMIODARONE HYDROCHLORIDE 200 MG/1
200 TABLET ORAL 2 TIMES DAILY
Qty: 60 TABLET | Refills: 0 | Status: SHIPPED | OUTPATIENT
Start: 2021-08-22

## 2021-08-22 RX ADMIN — Medication 10 ML: at 10:22

## 2021-08-22 RX ADMIN — FAMOTIDINE 10 MG: 10 TABLET ORAL at 10:22

## 2021-08-22 RX ADMIN — AMIODARONE HYDROCHLORIDE 200 MG: 200 TABLET ORAL at 10:22

## 2021-08-22 RX ADMIN — LEVOTHYROXINE SODIUM 100 MCG: 0.1 TABLET ORAL at 10:22

## 2021-08-22 RX ADMIN — HYDROXYZINE PAMOATE 25 MG: 25 CAPSULE ORAL at 00:36

## 2021-08-22 RX ADMIN — ASPIRIN 81 MG: 81 TABLET, COATED ORAL at 10:22

## 2021-08-22 RX ADMIN — ACETAMINOPHEN 650 MG: 325 TABLET ORAL at 00:36

## 2021-08-22 RX ADMIN — ENOXAPARIN SODIUM 40 MG: 40 INJECTION SUBCUTANEOUS at 10:22

## 2021-08-22 RX ADMIN — FUROSEMIDE 40 MG: 10 INJECTION, SOLUTION INTRAMUSCULAR; INTRAVENOUS at 10:22

## 2021-08-22 RX ADMIN — SPIRONOLACTONE 25 MG: 25 TABLET ORAL at 10:22

## 2021-08-22 RX ADMIN — CARVEDILOL 12.5 MG: 12.5 TABLET, FILM COATED ORAL at 10:22

## 2021-08-22 NOTE — PROGRESS NOTES
Progress Note      8/22/2021 10:55 AM  NAME: Seth Fair   MRN:  [id number]   Admit Diagnosis: Syncope [R55]  Acute CHF (congestive heart failure) ([facility] [I50.9]  VT (ventricular tachycardia) ([facility] [I47.2]  Hypokalemia [E87.6]      Problem List:   -Syncope  -Heart failure status post BiV ICD. -Ejection fraction of 15 to 20%  -Moderate to severe mitral regurgitation  -Hypertension  -Hypothyroidism  -History of V. fib arrest treated with shock status post AICD implantation     Assessment/Plan:   Note dictated August 22, 2021.  -Patient is lying comfortably in the bed and wants to go home.  -Nonischemic cardiomyopathy with an ejection fraction of 15 to 20% and she was admitted to the hospital following syncopal episode and her ICD shocked her with 39 J of current for ventricular fibrillation on August 14, 2021.  -Patient has been observed in our facility for last 4 days and she is stable and on amiodarone.  -As patient is a stable for last 48 hours and wants to go home, I will continue all current medical management and recommend to follow-up with her primary cardiologist Dr. Thu Blount in less than 2 weeks or earlier as needed to our hospital for my prolonged discussion with her.  -As patient is tolerating well I will increase the dose to 200 mg twice daily for amiodarone till she sees Dr. Thu Blount for further assessment and management. Okay to discharge per my cardiac assessment.  ------------------------------------------------------------------------------------------------------------  -26-year-old -American female sitting up in the bed and denies any symptoms this morning.  -Patient was admitted to the hospital with acute on chronic systolic heart failure probably secondary to underlying mitral regurgitation her last known ejection fraction was 15 to 20%.   -She is status post AICD implantation secondary to low ejection fraction and V. fib arrest in the past.  -Otherwise clinically and hemodynamically stable and denies any symptoms on today's examination.  -Continue current conservative medical management and to optimize guideline directed medical management at this time.  -No further cardiovascular testing as it would not change my cardiac management. []       High complexity decision making was performed in this patient at high risk for decompensation with multiple organ involvement. Subjective:     Samm Farmer is a 22-year-old -American female with history of cardiomyopathy with last known ejection fraction of 15 to 20% and severe mitral regurgitation status post AICD implantation secondary to V. fib arrest.  Multiple nonsustained VT per ICD interrogation but patient is a stable for last 48 hours so I will take the liberty to increase the dose of amiodarone and have her follow-up with primary cardiologist Dr. Sarmad Whatley in near future. She is sitting up in the chair this morning and denies any symptoms and otherwise clinically and hemodynamically stable. We will continue current conservative medical management with no further cardiovascular testing as it would not change my cardiac management. Discussed with RN events overnight. Review of Systems: Review of the system pertinent to cardiovascular symptoms are unremarkable. Symptom Y/N Comments  Symptom Y/N Comments   Fever/Chills N   Chest Pain N    Poor Appetite N   Edema N    Cough N   Abdominal Pain N    Sputum N   Joint Pain N    SOB/HORNER N   Pruritis/Rash N    Nausea/vomit N   Tolerating PT/OT Y    Diarrhea N   Tolerating Diet Y    Constipation N   Other       Could NOT obtain due to:      Objective:      Physical Exam:    Last 24hrs VS reviewed since prior progress note.  Most recent are:    Visit Vitals  /79   Pulse 62   Temp 97.6 °F (36.4 °C) Comment: z   Resp 18   Ht 5' 1\" (1.549 m)   Wt 61 kg (134 lb 7.7 oz)   SpO2 94%   BMI 25.41 kg/m²     No intake or output data in the 24 hours ending 08/22/21 0757 General Appearance: Well developed, well nourished, alert & oriented x 3,    no acute distress. Ears/Nose/Mouth/Throat: Hearing grossly normal.  Neck: Supple. Chest: Lungs clear to auscultation bilaterally. Cardiovascular: Regular rate and rhythm, S1S2 normal, no murmur. Abdomen: Soft, non-tender, bowel sounds are active. Extremities: No edema bilaterally. Skin: Warm and dry. []         Post-cath site without hematoma, bruit, tenderness, or thrill. Distal pulses intact. PMH/SH reviewed - no change compared to H&P    Data Review    Telemetry: normal sinus rhythm     EKG:   []  No new EKG for review  XR CHEST PORT   Final Result   Enlarged cardiac silhouette. Central pulmonary vascular congestion. Lab Data Personally Reviewed:    No results for input(s): WBC, HGB, HCT, PLT, HGBEXT, HCTEXT, PLTEXT, HGBEXT, HCTEXT, PLTEXT in the last 72 hours. No results for input(s): INR, PTP, APTT, INREXT, INREXT in the last 72 hours. Recent Labs     08/22/21  0539 08/21/21  0423 08/20/21  1110    137 139   K 3.7 3.7 3.9    102 101   CO2 29 28 30   BUN 41* 29* 24*   CREA 1.57* 1.52* 1.60*   GLU 98 112* 117*   CA 9.3 8.8 8.9     No results for input(s): CPK, CKNDX, TROIQ in the last 72 hours. No lab exists for component: CPKMB  No results found for: CHOL, CHOLX, CHLST, CHOLV, HDL, HDLP, LDL, LDLC, DLDLP, TGLX, TRIGL, TRIGP, CHHD, CHHDX    No results for input(s): AP, TBIL, TP, ALB, GLOB, GGT, AML, LPSE in the last 72 hours. No lab exists for component: SGOT, GPT, AMYP, HLPSE  No results for input(s): PH, PCO2, PO2 in the last 72 hours.     Medications Personally Reviewed:    Current Facility-Administered Medications   Medication Dose Route Frequency    amiodarone (CORDARONE) tablet 200 mg  200 mg Oral DAILY    sodium chloride (NS) flush 5-40 mL  5-40 mL IntraVENous Q8H    sodium chloride (NS) flush 5-40 mL  5-40 mL IntraVENous PRN    polyethylene glycol (MIRALAX) packet 17 g  17 g Oral DAILY PRN    enoxaparin (LOVENOX) injection 40 mg  40 mg SubCUTAneous DAILY    acetaminophen (TYLENOL) suppository 650 mg  650 mg Rectal Q6H PRN    Or    acetaminophen (TYLENOL) tablet 650 mg  650 mg Oral Q6H PRN    furosemide (LASIX) injection 40 mg  40 mg IntraVENous DAILY    aspirin delayed-release tablet 81 mg  81 mg Oral DAILY    carvediloL (COREG) tablet 12.5 mg  12.5 mg Oral BID WITH MEALS    levothyroxine (SYNTHROID) tablet 100 mcg  100 mcg Oral ACB    spironolactone (ALDACTONE) tablet 25 mg  25 mg Oral DAILY    famotidine (PEPCID) tablet 10 mg  10 mg Oral BID    hydrOXYzine pamoate (VISTARIL) capsule 25 mg  25 mg Oral Q6H PRN    ondansetron (ZOFRAN) injection 4 mg  4 mg IntraVENous Q4H PRN         Nanci Varma MD

## 2021-08-22 NOTE — DISCHARGE SUMMARY
HOSPITALIST DISCHARGE SUMMARY    NAME: Owen Lutz   :  1945   MRN:  [id number]     Date/Time:  2021 1:23 PM    DISCHARGE DIAGNOSIS:  Active Problems:    Hypokalemia (2021)      Syncope (2021)      VT (ventricular tachycardia) ([facility] (2021)      [facility]est[facility] (4419)    Ch systolic chf    Admission Diagnosis:  vtach    CONSULTATIONS: Cardiology    Procedures: see electronic medical records for all procedures/Xrays and details which were not copied into this note but were reviewed prior to creation of Plan. Please follow-up tests/labs that are still pendin. BMP with cardiology    DISCHARGE SUMMARY/HOSPITAL COURSE: for full details see H&P, daily progress notes, labs, consult notes. Briefly As Per HPI:  68-year-old female with a history of hypertension, hypothyroidism, congestive heart failure status post AICD placement was admitted to the hospital with a syncopal episode. Patient was found to have nonsustained V. tach and multiple AICD firing on interrogation in the ER. Patient was started on amiodarone drip. Patient is comfortable now. She was also noticed to have hypokalemia which is improved after supplementation. Patient was evaluated by cardiology during the hospital stay. Echocardiogram was done which showed EF of 15 to 20%. Moderate to severe mitral regurgitation. Follow-up with primary cardiologist next week. Amiodarone increased to twice daily. _______________________________________________________________________   Patient seen and examined by me on day of discharge.   Pertinent findings are:  Vitals:  Visit Vitals  /73 (BP 1 Location: Left upper arm, BP Patient Position: Supine)   Pulse 60   Temp 98.2 °F (36.8 °C)   Resp 18   Ht 5' 1\" (1.549 m)   Wt 61 kg (134 lb 7.7 oz)   SpO2 100%   BMI 25.41 kg/m²     Temp (24hrs), Av.8 °F (36.6 °C), Min:97.4 °F (36.3 °C), Max:98.2 °F (36.8 °C)      Last 24hr Input/Output:  No intake or output data in the 24 hours ending 08/22/21 1323     PHYSICAL EXAM:   General: Alert and awake  Skin: Extremities and face reveal no rashes. No grubbs erythema. No telangiectasias on the chest wall. HEENT: Sclerae anicteric. Extra-occular muscles are intact. No oral ulcers. No ENT discharge. The neck is supple. Cardiovascular: Regular rate and rhythm. No murmurs, gallops, or rubs. PMI nondisplaced. Carotids without bruits. Respiratory: Comfortable breathing with no accessory muscle use. Clear breath sounds with no wheezes, rales, or rhonchi. GI: Abdomen nondistended, soft, and nontender. Normal active bowel sounds. No enlargement of the liver or spleen. No masses palpable. Rectal: Deferred   Musculoskeletal: No pitting edema of the lower legs. Extremities have good range of motion. No costovertebral tenderness. Neurological: Gross memory appears intact. Patient is alert and oriented. Power 5/5  Psychiatric: Mood appears appropriate with judgement intact. Lymphatic: No cervical or supraclavicular adenopathy. See Discharge Instructions for further details. _______________________________________________________________________  DISPOSITION:    Home with Family: x   Home with HH/PT/OT/RN:    SNF/LTC:    RENAE:    OTHER:    ________________________________________________________________________  Medications Reviewed:  Current Discharge Medication List      START taking these medications    Details   potassium chloride (Klor-Con) 20 mEq pack Take 1 Packet by mouth daily for 14 days. Qty: 14 Packet, Refills: 0  Start date: 8/21/2021, End date: 9/4/2021         CONTINUE these medications which have CHANGED    Details   amiodarone (CORDARONE) 200 mg tablet Take 1 Tablet by mouth two (2) times a day. Qty: 60 Tablet, Refills: 0  Start date: 8/22/2021      spironolactone (Aldactone) 25 mg tablet Take 1 Tablet by mouth daily.   Qty: 30 Tablet, Refills: 0  Start date: 8/21/2021 sacubitriL-valsartan (Entresto) 24-26 mg tablet Take 1 Tablet by mouth daily. Qty: 30 Tablet, Refills: 0  Start date: 8/21/2021         CONTINUE these medications which have NOT CHANGED    Details   carvediloL (COREG) 12.5 mg tablet Take 12.5 mg by mouth two (2) times daily (with meals). furosemide (Lasix) 40 mg tablet Take 40 mg by mouth daily. levothyroxine (Synthroid) 100 mcg tablet Take 100 mcg by mouth Daily (before breakfast). aspirin delayed-release 81 mg tablet Take 81 mg by mouth daily. STOP taking these medications       isosorbide-hydrALAZINE (BiDiL) 20-37.5 mg per tablet Comments:   Reason for Stopping:         POTASSIUM CHLORIDE Comments:   Reason for Stopping:               PMH/SH reviewed - no change compared to H&P  ________________________________________________________________________    Recommended diet:  DIET ADULT    Recommended activity:Activity as tolerated       Follow Up: Follow-up Information     Follow up With Specialties Details Why Contact Info    Brittany Genao MD Cardiology In 1 week  [address] [place] [address]  [phone]      None    None (395) Patient stated that they have no PCP             ______________________________________________________________________  Total time spent in discharge (min): >35 min   ________________________________________________________________________    Care Plan discussed with:    Comments   Patient x    Family      RN x    Care Manager x                   Consultant:  x Cardiology   ________________________________________________________________________  Attending Physician:  Erasto Glimpse, MD  ________________________________________________________________________  **Lab Data Reviewed:  Recent Results (from the past 24 hour(s))   METABOLIC PANEL, BASIC    Collection Time: 08/22/21  5:39 AM   Result Value Ref Range    Sodium 136 136 - 145 mmol/L    Potassium 3.7 3.5 - 5.1 mmol/L    Chloride 100 97 - 108 mmol/L    CO2 29 21 - 32 mmol/L    Anion gap 7 5 - 15 mmol/L    Glucose 98 65 - 100 mg/dL    BUN 41 (H) 6 - 20 mg/dL    Creatinine 1.57 (H) 0.55 - 1.02 mg/dL    BUN/Creatinine ratio 26 (H) 12 - 20      GFR est AA 39 (L) >60 ml/min/1.73m2    GFR est non-AA 32 (L) >60 ml/min/1.73m2    Calcium 9.3 8.5 - 10.1 mg/dL     XR CHEST PORT   Final Result   Enlarged cardiac silhouette. Central pulmonary vascular congestion. Echo: Interpretation Summary    Result status: Final result   · LV: Estimated LVEF is 15 - 20%. Normal wall thickness. Moderately dilated left ventricle. Severely and globally reduced systolic function. · LA: Moderately dilated left atrium. · MV: Mitral valve thickening. Severe mitral valve regurgitation is present. · TV: Moderate tricuspid valve regurgitation is present. Right Ventricular Arterial Pressure (RVSP) is 53 mmHg. · RV: Dilated right ventricle. Reduced systolic function. Pacer/ICD present. · AV: Aortic valve leaflet calcification present without reduced excursion. · PA: Pulmonary arterial systolic pressure is 53 mmHg.

## 2021-08-22 NOTE — PROGRESS NOTES
Discharge instruction completed with patient and she verbalized understanding. IV access and telemetry discontinued, family provided transportation.

## 2022-03-18 PROBLEM — R55 SYNCOPE: Status: ACTIVE | Noted: 2021-08-19

## 2022-03-18 PROBLEM — I50.9 ACUTE CHF (CONGESTIVE HEART FAILURE) (HCC): Status: ACTIVE | Noted: 2021-08-19

## 2022-03-19 PROBLEM — E87.6 HYPOKALEMIA: Status: ACTIVE | Noted: 2021-08-19

## 2022-03-19 PROBLEM — I47.20 VT (VENTRICULAR TACHYCARDIA) (HCC): Status: ACTIVE | Noted: 2021-08-19

## 2023-05-21 RX ORDER — SACUBITRIL AND VALSARTAN 24; 26 MG/1; MG/1
1 TABLET, FILM COATED ORAL DAILY
COMMUNITY
Start: 2021-08-21

## 2023-05-21 RX ORDER — SPIRONOLACTONE 25 MG/1
25 TABLET ORAL DAILY
COMMUNITY
Start: 2021-08-21

## 2023-05-21 RX ORDER — LEVOTHYROXINE SODIUM 0.1 MG/1
100 TABLET ORAL
COMMUNITY

## 2023-05-21 RX ORDER — CARVEDILOL 12.5 MG/1
12.5 TABLET ORAL 2 TIMES DAILY WITH MEALS
COMMUNITY

## 2023-05-21 RX ORDER — ASPIRIN 81 MG/1
81 TABLET ORAL DAILY
COMMUNITY

## 2023-05-21 RX ORDER — AMIODARONE HYDROCHLORIDE 200 MG/1
200 TABLET ORAL 2 TIMES DAILY
COMMUNITY
Start: 2021-08-22

## 2023-05-21 RX ORDER — FUROSEMIDE 40 MG/1
40 TABLET ORAL DAILY
COMMUNITY